# Patient Record
Sex: FEMALE | Race: WHITE | NOT HISPANIC OR LATINO | Employment: OTHER | URBAN - METROPOLITAN AREA
[De-identification: names, ages, dates, MRNs, and addresses within clinical notes are randomized per-mention and may not be internally consistent; named-entity substitution may affect disease eponyms.]

---

## 2017-05-03 ENCOUNTER — HOSPITAL ENCOUNTER (OUTPATIENT)
Dept: RADIOLOGY | Facility: CLINIC | Age: 32
Discharge: HOME | End: 2017-05-03
Payer: COMMERCIAL

## 2017-05-03 ENCOUNTER — ALLSCRIPTS OFFICE VISIT (OUTPATIENT)
Dept: OTHER | Facility: OTHER | Age: 32
End: 2017-05-03

## 2017-05-03 DIAGNOSIS — M79.675 PAIN OF TOE OF LEFT FOOT: ICD-10-CM

## 2017-05-03 PROCEDURE — 73660 X-RAY EXAM OF TOE(S): CPT

## 2017-12-14 ENCOUNTER — ALLSCRIPTS OFFICE VISIT (OUTPATIENT)
Dept: OTHER | Facility: OTHER | Age: 32
End: 2017-12-14

## 2018-01-09 NOTE — RESULT NOTES
Verified Results  (1) CBC/PLT/DIFF [id number] 08:55AM [facility] [place]     Test Name Result Flag Reference   WBC 8 0 x10E3/uL  3 4-10 8   RBC 4 75 x10E6/uL  3 77-5 28   Hemoglobin 15 2 g/dL  11 1-15 9   Hematocrit 43 0 %  34 0-46  6   MCV 91 fL  79-97   MCH 32 0 pg  26 6-33 0   MCHC 35 3 g/dL  31 5-35 7   RDW 12 8 %  12 3-15 4   Platelets 610 N88Q7/KM  150-379   Neutrophils 59 %     Lymphs 25 %     Monocytes 6 %     Eos 9 %     Basos 1 %     Neutrophils (Absolute) 4 8 x10E3/uL  1 4-7 0   Lymphs (Absolute) 2 0 x10E3/uL  0 7-3 1   Monocytes(Absolute) 0 4 x10E3/uL  0 1-0 9   Eos (Absolute) 0 7 x10E3/uL H 0 0-0 4   Baso (Absolute) 0 1 x10E3/uL  0 0-0 2   Immature Granulocytes 0 %     Immature Grans (Abs) 0 0 x10E3/uL  0 0-0 1     (1) COMPREHENSIVE METABOLIC PANEL [id number] 14:98BW [facility]     Test Name Result Flag Reference   Glucose, Serum 99 mg/dL  65-99   BUN 13 mg/dL  6-20   Creatinine, Serum 0 76 mg/dL  0 57-1 00   eGFR If NonAfricn Am 103 mL/min/1 73  >59   eGFR If Africn Am 119 mL/min/1 73  >59   BUN/Creatinine Ratio 17  8-20   Sodium, Serum 139 mmol/L  134-144   Potassium, Serum 3 9 mmol/L  3 5-5 2   Chloride, Serum 102 mmol/L     Carbon Dioxide, Total 21 mmol/L  18-29   Calcium, Serum 9 2 mg/dL  8 7-10 2   Protein, Total, Serum 7 0 g/dL  6 0-8 5   Albumin, Serum 4 6 g/dL  3 5-5 5   Globulin, Total 2 4 g/dL  1 5-4 5   A/G Ratio 1 9  1 1-2 5   Bilirubin, Total 0 2 mg/dL  0 0-1 2   Alkaline Phosphatase, S 58 IU/L     AST (SGOT) 19 IU/L  0-40   ALT (SGPT) 23 IU/L  0-32     (1) LIPID PANEL FASTING W DIRECT LDL REFLEX [id number] 08:55AM [facility]     Test Name Result Flag Reference   Cholesterol, Total 217 mg/dL H 100-199   Triglycerides 215 mg/dL H 0-149   HDL Cholesterol 44 mg/dL  >39   According to ATP-III Guidelines, HDL-C >59 mg/dL is considered a  negative risk factor for CHD     LDL Cholesterol Calc 130 mg/dL H 0-99     (1) TSH [id number] 08:55AM [facility]     Test Name Result Flag Reference   TSH 1 820 uIU/mL  0 450-4 500     (1) IRON SATURATION %, TIBC [id number] 08:55AM [facility] [place]     Test Name Result Flag Reference   Iron Bind  Cap (TIBC) 409 ug/dL  250-450   UIBC 351 ug/dL  131-425   Iron, Serum 58 ug/dL     Iron Saturation 14 % L 15-55     (1) VITAMIN D 25-HYDROXY 10Pso1147 08:55AM [facility]     Test Name Result Flag Reference   Vitamin D, 25-Hydroxy 25 4 ng/mL L 30 0-100 0   Vitamin D deficiency has been defined by the [address] practice guideline as a  level of serum 25-OH vitamin D less than 20 ng/mL (1,2)  The Endocrine Society went on to further define vitamin D  insufficiency as a level between 21 and 29 ng/mL (2)  1  IOM ([place] [employer])  2010  Dietary reference     intakes for calcium and D  [facility]: The     [employer]  2  Anjali MF, Lance WHELAN, Zehra BAKER, et al      Evaluation, treatment, and prevention of vitamin D     deficiency: an Endocrine Society clinical practice     guideline  JCEM  2011 Jul; 96(7):1911-30  Discussion/Summary   Blood count is normal       Sugar, kidneys, minerals and liver are normal       Cholesterol profile is normal/ok  You could use a little more iron in your diet daily  Thyroid level is normal       You could iimprove your vitamin D by increasing your vitamin D intake by an additional 1000 units/day   Dr Rohit Sánchez

## 2018-01-12 VITALS
HEIGHT: 66 IN | DIASTOLIC BLOOD PRESSURE: 86 MMHG | BODY MASS INDEX: 29.73 KG/M2 | SYSTOLIC BLOOD PRESSURE: 118 MMHG | WEIGHT: 185 LBS

## 2018-01-13 NOTE — PROGRESS NOTES
Assessment    1  Eczema, unspecified type (692 9) (L30 9)   2  Encounter for preventive health examination (V70 0) (Z00 00)   3  Immunization due (V05 9) (Z23)   4  Screening for cardiovascular condition (V81 2) (Z13 6)   5  Screening for diabetes mellitus (DM) (V77 1) (Z13 1)   6  Fatigue (780 79) (R53 83)   7  Avitaminosis D (268 9) (E55 9)   8  BMI 31 0-31 9,adult (V85 31) (Z68 31)   9  Obesity, unspecified obesity severity, unspecified obesity type (278 00) (E66 9)    Plan  Avitaminosis D, Health Maintenance, Fatigue, Screening for cardiovascular condition,  Screening for diabetes mellitus (DM)    · (1) VITAMIN D 25-HYDROXY; Status:Active; Requested for:55Hlc7989;   Eczema, unspecified type    · From  Mometasone Furoate 0 1 % External Solution One drop in ears as  needed To Mometasone Furoate 0 1 % External Solution 3 gtts in affected ear bid prn  eczema, short term  Health Maintenance, Fatigue, Screening for cardiovascular condition, Screening for  diabetes mellitus (DM)    · (1) CBC/PLT/DIFF; Status:Active; Requested for:24Vee0812;    · (1) COMPREHENSIVE METABOLIC PANEL; Status:Active; Requested for:94Bcr2394;    · (1) IRON SATURATION %, TIBC; Status:Active; Requested for:16Ifc1778;    · (1) LIPID PANEL FASTING W DIRECT LDL REFLEX; Status:Active; Requested  for:60Mlx6835;    · (1) TSH; Status:Active; Requested for:95Mtd0553;   Immunization due    · Fluzone Quadrivalent Intramuscular Suspension    Discussion/Summary  health maintenance visit Advice and education were given regarding nutrition, aerobic exercise, weight loss, calcium supplements, vitamin D supplements and sunscreen use  Patient discussion: discussed with the patient  Chief Complaint  Seen for a CPE  er/cma  History of Present Illness  HM, Adult Female: The patient is being seen for a health maintenance and no probs during preg except some anemia evaluation  General Health: Immunizations status: up to date  Lifestyle:   She does not have a healthy diet  She has weight concerns  She does not exercise regularly  She does not use tobacco  She denies alcohol use  70# in 3y gain  Reproductive health:  she reports normal menses  Screening:   HPI: csection  anemia?some extra iron pregnancy  20m postpartum  periods mostly normal  lasik surgery  uterine cysts removed  not on bcp  ear eczema       Review of Systems    Constitutional: feeling tired  Cardiovascular: no chest pain  Respiratory: no shortness of breath  Gastrointestinal: constipation and diarrhea, but no blood in stools  Psychiatric: no anxiety and no depression  Active Problems    1  Immunization due (V05 9) (Z23)   2  Screening for cardiovascular condition (V81 2) (Z13 6)   3  Screening for diabetes mellitus (DM) (V77 1) (Z13 1)    Past Medical History    · Acute upper respiratory infection (465 9) (J06 9)   · History of Clomid pregnancy (V23 0) (O09 00)   · History of nausea (V12 79) (Z87 898)   · History of pregnancy (V13 29)   · No pertinent past medical history    Surgical History    · History of  Section    Family History  Father    · Family history of hypertension (V17 49) (Z82 49)    Social History    · Never a smoker    Current Meds   1  Mometasone Furoate 0 1 % External Solution; One drop in ears as needed Recorded    Allergies    1  Amoxicillin CAPS    2  No Known Environmental Allergies   3  No Known Food Allergies    Vitals   Recorded: 78PZZ4469 19:84GY   Systolic 046   Diastolic 76   Heart Rate 80   Respiration 16   Temperature 98 9 F   LMP 01-Sep-2016   Height 5 ft 6 in   Weight 193 lb    BMI Calculated 31 15   BSA Calculated 1 98     Physical Exam    Constitutional   General appearance: No acute distress, well appearing and well nourished  Eyes   Conjunctiva and lids: No swelling, erythema or discharge  Pupils and irises: Equal, round, reactive to light  Ophthalmoscopic examination: Normal fundi and optic discs      Ears, Nose, Mouth, and Throat   External inspection of ears and nose: Normal     Otoscopic examination: Tympanic membranes translucent with normal light reflex  Canals patent without erythema  Hearing: Normal     Nasal mucosa, septum, and turbinates: Normal without edema or erythema  Lips, teeth, and gums: Normal, good dentition  Oropharynx: Normal with no erythema, edema, exudate or lesions  Neck   Neck: Supple, symmetric, trachea midline, no masses  Thyroid: Normal, no thyromegaly  Pulmonary   Respiratory effort: No increased work of breathing or signs of respiratory distress  Auscultation of lungs: Clear to auscultation  Cardiovascular   Auscultation of heart: Normal rate and rhythm, normal S1 and S2, no murmurs  Carotid pulses: 2+ bilaterally  Pedal pulses: 2+ bilaterally  Examination of extremities for edema and/or varicosities: Normal     Abdomen   Abdomen: Non-tender, no masses  Liver and spleen: No hepatomegaly or splenomegaly  Lymphatic   Palpation of lymph nodes in neck: No lymphadenopathy  Musculoskeletal   Gait and station: Normal     Digits and nails: Normal without clubbing or cyanosis  Joints, bones, and muscles: Normal     Range of motion: Normal     Stability: Normal     Skin   Skin and subcutaneous tissue: Normal without rashes or lesions  Palpation of skin and subcutaneous tissue: Normal turgor  Neurologic   Reflexes: 2+ and symmetric  Sensation: No sensory loss  Psychiatric   Judgment and insight: Normal     Mood and affect: Normal        Procedure    Procedure: Visual Acuity Test    Indication: routine screening  Inforrmation supplied by a Snellen chart     Results: 20/13 in both eyes without corrective device, 20/13 in the right eye without corrective device, 20/13 in the left eye without corrective device      Signatures   Electronically signed by : Neva Cox DO; Sep 21 2016 11:26PM EST                       (Author)

## 2018-01-23 NOTE — PROGRESS NOTES
Assessment    1  Encounter for preventive health examination (V70 0) (Z00 00)   2  Adult BMI 27 0-27 9 kg/sq m (V85 23) (Z68 27)   3  Screening for cardiovascular condition (V81 2) (Z13 6)   4  Screening for diabetes mellitus (DM) (V77 1) (Z13 1)   5  Vitamin D deficiency (268 9) (E55 9)   6  Fatigue (780 79) (R53 83)   7  Eczema, unspecified type (692 9) (L30 9)    Plan  Adult BMI 27 0-27 9 kg/sq m    · Begin a limited exercise program ; Status:Complete;   Done: 21HUH6446   · Eat a low fat and low cholesterol diet ; Status:Complete;   Done: 22VFW5182   · Some eating tips that can help you lose weight ; Status:Complete;   Done: 64QAB5974   · We recommend that you bring your body mass index down to 26 ; Status:Complete;    Done: 94MEB7011  Adult BMI 27 0-27 9 kg/sq m, Fatigue, Screening for cardiovascular condition, Screening  for diabetes mellitus (DM), Vitamin D deficiency    · (1) CBC/PLT/DIFF; Status:Active; Requested for:20Xht4807;    · (1) COMPREHENSIVE METABOLIC PANEL; Status:Active; Requested for:10Ent8003;    · (1) LIPID PANEL FASTING W DIRECT LDL REFLEX; Status:Active; Requested  for:72Smy1403;    · (1) TSH; Status:Active; Requested for:96Bfg1826;    · (1) VITAMIN D 25-HYDROXY; Status:Active; Requested for:87Nuh6397;   Eczema, unspecified type    · Mometasone Furoate 0 1 % External Solution; 3 gtts in affected ear bid prn  eczema, short term    Discussion/Summary  health maintenance visit Currently, she eats a healthy diet and has an inadequate exercise regimen  the risks and benefits of cervical cancer screening were discussed cervical cancer screening is current Will collect reports Breast cancer screening: the risks and benefits of breast cancer screening were discussed, self breast exam technique was taught and monthly self breast exam was advised  Colorectal cancer screening: the risks and benefits of colorectal cancer screening were discussed and colorectal cancer screening is not indicated  Osteoporosis screening: the risks and benefits of osteoporosis screening were discussed  Screening lab work includes hemoglobin, glucose, lipid profile, thyroid function testing and 25-hydroxyvitamin D  The risks and benefits of immunizations were discussed and immunizations are up to date  Advice and education were given regarding nutrition, aerobic exercise, weight bearing exercise, weight loss, calcium supplements, vitamin D supplements, reproductive health, contraception, cardiovascular risk reduction, alcohol use, sunscreen use, self skin examination, helmet use, seat belt use and fall risk reduction  Patient discussion: discussed with the patient  Physical done and blood work ordered  Will collect PAP report  Will call with Tdap date and received couple of years ago as per patient  Patient educated and instructions provided when to seek immediate medical attention  The patient was counseled regarding instructions for management, risk factor reductions, patient and family education, importance of compliance with treatment  Possible side effects of new medications were reviewed with the patient/guardian today  The treatment plan was reviewed with the patient/guardian  The patient/guardian understands and agrees with the treatment plan      Chief Complaint  pt present for CPE, no forms  af/rma      History of Present Illness  HM, Adult Female: The patient is being seen for a health maintenance evaluation  General Health: The patient's health since the last visit is described as good  She has regular dental visits  She denies vision problems  She denies hearing loss  Lifestyle:  She consumes a diverse and healthy diet  She does not have any weight concerns  She does not exercise regularly  She does not use tobacco  She denies alcohol use  She denies drug use  Reproductive health:  she reports normal menses  she uses no contraception  she is sexually active  pregnancy history:     Screening: HPI: Here for physical   Patient had the PAP 2 years ago at [facility]  Denies any concerns and complaints  Not on any current medications  Review of Systems    Constitutional: No fever, no chills, feels well, no tiredness, no recent weight gain or weight loss  Eyes: No complaints of eye pain, no red eyes, no eyesight problems, no discharge, no dry eyes, no itching of eyes  ENT: no complaints of earache, no loss of hearing, no nose bleeds, no nasal discharge, no sore throat, no hoarseness  Cardiovascular: No complaints of slow heart rate, no fast heart rate, no chest pain, no palpitations, no leg claudication, no lower extremity edema  Respiratory: No complaints of shortness of breath, no wheezing, no cough, no SOB on exertion, no orthopnea, no PND  Gastrointestinal: No complaints of abdominal pain, no constipation, no nausea or vomiting, no diarrhea, no bloody stools  Genitourinary: No complaints of dysuria, no incontinence, no pelvic pain, no dysmenorrhea, no vaginal discharge or bleeding  Musculoskeletal: No complaints of arthralgias, no myalgias, no joint swelling or stiffness, no limb pain or swelling  Integumentary: No complaints of skin rash or lesions, no itching, no skin wounds, no breast pain or lump  Neurological: No complaints of headache, no confusion, no convulsions, no numbness, no dizziness or fainting, no tingling, no limb weakness, no difficulty walking  Psychiatric: Not suicidal, no sleep disturbance, no anxiety or depression, no change in personality, no emotional problems  Endocrine: No complaints of proptosis, no hot flashes, no muscle weakness, no deepening of the voice, no feelings of weakness  Hematologic/Lymphatic: No complaints of swollen glands, no swollen glands in the neck, does not bleed easily, does not bruise easily  Active Problems    1  Eczema, unspecified type (692 9) (L30 9)   2  Fatigue (780 79) (R53 83)   3  Immunization due (V05 9) (Z23)   4  Obesity, unspecified obesity severity, unspecified obesity type (278 00) (E66 9)   5  Pain of toe of left foot (729 5) (M79 675)   6  Patellofemoral arthritis of right knee (716 96) (M17 11)   7  Right knee pain (719 46) (M25 561)   8  Right patellofemoral syndrome (719 46) (M22 2X1)   9  Screening for cardiovascular condition (V81 2) (Z13 6)   10  Screening for diabetes mellitus (DM) (V77 1) (Z13 1)   11  Sprain of left great toe (845 10) (Q74 465R)    Past Medical History    · Acute conjunctivitis (372 00) (H10 30)   · Acute upper respiratory infection (465 9) (J06 9)   · History of Clomid pregnancy (V23 0) (O09 00)   · History of nausea (V12 79) (Z87 898)   · History of pregnancy (V13 29)   · No pertinent past medical history    Surgical History    · History of  Section   · History of Oral Surgery Tooth Extraction   · History of Uterine Surgery    Family History  Father    · Family history of hypertension (V17 49) (Z82 49)  Family History    · Family history of arthritis (V17 7) (Z82 61)    Social History    · Alcohol use (V49 89) (Z78 9)   · Never a smoker   · Non smoker / tobacco user (V49 89) (Z78 9)    Current Meds   1  Mometasone Furoate 0 1 % External Solution; 3 gtts in affected ear bid prn eczema,   short term; Last Rx:98Pke9883 Ordered   2  Multiple Vitamins Oral Tablet; TAKE 1 TABLET DAILY; Therapy: 58UQH6905 to Recorded    Allergies    1  amoxicillin   2  Amoxicillin CAPS    3  No Known Environmental Allergies   4  No Known Food Allergies    Vitals   Recorded: 77Zuk4002 11:06AM   Temperature 98 1 F   Heart Rate 82   Respiration 16   Systolic 488   Diastolic 86   Height 5 ft 6 in   Weight 171 lb 2 oz   BMI Calculated 27 62   BSA Calculated 1 87     Physical Exam    Constitutional   General appearance: No acute distress, well appearing and well nourished  Head and Face   Head and face: Normal     Palpation of the face and sinuses: No sinus tenderness  Eyes   Conjunctiva and lids: No swelling, erythema or discharge  Ears, Nose, Mouth, and Throat   External inspection of ears and nose: Normal     Otoscopic examination: Tympanic membranes translucent with normal light reflex  Canals patent without erythema  Nasal mucosa, septum, and turbinates: Normal without edema or erythema  Lips, teeth, and gums: Normal, good dentition  Oropharynx: Normal with no erythema, edema, exudate or lesions  Neck   Neck: Supple, symmetric, trachea midline, no masses  Thyroid: Normal, no thyromegaly  Pulmonary   Respiratory effort: No increased work of breathing or signs of respiratory distress  Palpation of chest: Normal     Auscultation of lungs: Clear to auscultation  Cardiovascular   Auscultation of heart: Normal rate and rhythm, normal S1 and S2, no murmurs  Carotid pulses: 2+ bilaterally  Femoral pulses: 2+ bilaterally  Pedal pulses: 2+ bilaterally  Examination of extremities for edema and/or varicosities: Normal     Chest Done by GYN  Abdomen   Abdomen: Non-tender, no masses  Liver and spleen: No hepatomegaly or splenomegaly  Examination for hernias: No hernia appreciated  Genitourinary Done by GYN  Lymphatic   Palpation of lymph nodes in neck: No lymphadenopathy  Palpation of lymph nodes in axillae: No lymphadenopathy  Palpation of lymph nodes in groin: No lymphadenopathy  Musculoskeletal   Gait and station: Normal     Digits and nails: Normal without clubbing or cyanosis  Joints, bones, and muscles: Normal     Range of motion: Normal     Stability: Normal     Skin   Skin and subcutaneous tissue: Normal without rashes or lesions  Neurologic   Reflexes: 2+ and symmetric  Sensation: No sensory loss  Coordination: Normal finger to nose and heel to shin  Psychiatric   Judgment and insight: Normal     Orientation to person, place, and time: Normal     Recent and remote memory: Intact      Mood and affect: Normal        Results/Data  PHQ-2 Adult Depression Screening 34ASF9447 11:08AM User, Ahs     Test Name Result Flag Reference   PHQ-2 Adult Depression Score 0     Over the last two weeks, how often have you been bothered by any of the following problems? Little interest or pleasure in doing things: Not at all - 0  Feeling down, depressed, or hopeless: Not at all - 0   PHQ-2 Adult Depression Screening Negative         Procedure    Procedure: Visual Acuity Test    Inforrmation supplied by a Snellen chart  Results: 20/13 in both eyes without corrective device, 20/13 in the right eye without corrective device, 20/15 in the left eye without corrective device      Attending Note  Collaborating Physician Note: Collaborating Physician: I agree with the Advanced Practitioner note        Signatures   Electronically signed by : Tristan Shen; Dec 14 2017 11:32AM EST                       (Author)    Electronically signed by : Aba Rodriguez DO; Dec 14 2017 12:36PM EST                       (Author)

## 2018-01-24 VITALS
DIASTOLIC BLOOD PRESSURE: 86 MMHG | SYSTOLIC BLOOD PRESSURE: 112 MMHG | BODY MASS INDEX: 27.5 KG/M2 | HEIGHT: 66 IN | RESPIRATION RATE: 16 BRPM | HEART RATE: 82 BPM | TEMPERATURE: 98.1 F | WEIGHT: 171.13 LBS

## 2018-06-21 ENCOUNTER — TELEPHONE (OUTPATIENT)
Dept: FAMILY MEDICINE CLINIC | Facility: CLINIC | Age: 33
End: 2018-06-21

## 2018-06-21 DIAGNOSIS — R53.83 OTHER FATIGUE: ICD-10-CM

## 2018-06-21 DIAGNOSIS — Z13.6 SCREENING FOR CARDIOVASCULAR CONDITION: Primary | ICD-10-CM

## 2018-06-21 NOTE — TELEPHONE ENCOUNTER
Saw Albin Parrish and was suppose to get bloodwork after physical   I do not see orders in chart?     [facility]     Call when ready for  Deyvi Nails

## 2018-08-31 ENCOUNTER — OFFICE VISIT (OUTPATIENT)
Dept: FAMILY MEDICINE CLINIC | Facility: CLINIC | Age: 33
End: 2018-08-31
Payer: COMMERCIAL

## 2018-08-31 VITALS
HEART RATE: 82 BPM | TEMPERATURE: 97.4 F | RESPIRATION RATE: 18 BRPM | WEIGHT: 168 LBS | DIASTOLIC BLOOD PRESSURE: 72 MMHG | BODY MASS INDEX: 27 KG/M2 | HEIGHT: 66 IN | SYSTOLIC BLOOD PRESSURE: 116 MMHG

## 2018-08-31 DIAGNOSIS — M54.2 CERVICALGIA: Primary | ICD-10-CM

## 2018-08-31 PROCEDURE — 1036F TOBACCO NON-USER: CPT | Performed by: FAMILY MEDICINE

## 2018-08-31 PROCEDURE — 99213 OFFICE O/P EST LOW 20 MIN: CPT | Performed by: FAMILY MEDICINE

## 2018-08-31 PROCEDURE — 3008F BODY MASS INDEX DOCD: CPT | Performed by: FAMILY MEDICINE

## 2018-08-31 RX ORDER — MOMETASONE FUROATE 1 MG/ML
SOLUTION TOPICAL AS NEEDED
COMMUNITY
End: 2021-07-31

## 2018-08-31 RX ORDER — CYCLOBENZAPRINE HCL 10 MG
10 TABLET ORAL
Qty: 21 TABLET | Refills: 0 | Status: SHIPPED | OUTPATIENT
Start: 2018-08-31 | End: 2021-07-31

## 2018-08-31 RX ORDER — PREDNISONE 20 MG/1
TABLET ORAL
Qty: 18 TABLET | Refills: 0 | Status: SHIPPED | OUTPATIENT
Start: 2018-08-31 | End: 2021-07-31

## 2018-08-31 RX ORDER — NAPROXEN 500 MG/1
TABLET ORAL 2 TIMES DAILY
COMMUNITY
Start: 2018-08-28

## 2018-08-31 RX ORDER — MOXIFLOXACIN 5 MG/ML
1 SOLUTION/ DROPS OPHTHALMIC AS NEEDED
COMMUNITY
Start: 2016-12-07 | End: 2021-07-31

## 2018-08-31 NOTE — PROGRESS NOTES
Assessment/Plan:    Problem List Items Addressed This Visit     None      Visit Diagnoses     Cervicalgia    -  Primary    Relevant Medications    predniSONE 20 mg tablet    cyclobenzaprine (FLEXERIL) 10 mg tablet          There are no Patient Instructions on file for this visit  No Follow-up on file  Subjective:      Patient ID: Nancy Mckeon is a 26 y o  female  Chief Complaint   Patient presents with    muscle spasms in neck and R shoulder for the past week     rmklpn    Headache       Pt states she has been neck pain and spasm that has been getting worse all week  One day she woke up and is causing sghooting pain now its going up and down rt shoulder  failer otc as well as massage and hot pads  The following portions of the patient's history were reviewed and updated as appropriate: allergies, current medications, past family history, past medical history, past social history, past surgical history and problem list     Review of Systems   Constitutional: Negative  Negative for activity change, appetite change, chills, diaphoresis and fatigue  HENT: Negative  Negative for dental problem, ear pain, sinus pressure and sore throat  Eyes: Negative  Negative for photophobia, pain, discharge, redness, itching and visual disturbance  Respiratory: Negative for apnea and chest tightness  Cardiovascular: Negative  Negative for chest pain, palpitations and leg swelling  Gastrointestinal: Negative  Negative for abdominal distention, abdominal pain, constipation and diarrhea  Endocrine: Negative  Negative for cold intolerance and heat intolerance  Genitourinary: Negative  Negative for difficulty urinating and dyspareunia  Musculoskeletal: Positive for arthralgias, myalgias, neck pain and neck stiffness  Negative for back pain  Skin: Negative  Allergic/Immunologic: Negative for environmental allergies  Neurological: Negative  Negative for dizziness     Psychiatric/Behavioral: Negative  Negative for agitation  Current Outpatient Prescriptions   Medication Sig Dispense Refill    mometasone (ELOCON) 0 1 % lotion Apply topically as needed      moxifloxacin (VIGAMOX) 0 5 % ophthalmic solution Apply 1 drop to eye as needed      Multiple Vitamins-Minerals (MULTIVITAMIN ADULT PO) Take 1 tablet by mouth daily      naproxen (NAPROSYN) 500 mg tablet 2 (two) times a day      cyclobenzaprine (FLEXERIL) 10 mg tablet Take 1 tablet (10 mg total) by mouth daily at bedtime for 21 days 21 tablet 0    predniSONE 20 mg tablet 3 tabs for three days, 2 tabs for three days, 1 tab for three days, 1/2 tab for 4 days 18 tablet 0     No current facility-administered medications for this visit  Objective:    /72   Pulse 82   Temp (!) 97 4 °F (36 3 °C)   Resp 18   Ht 5' 6" (1 676 m)   Wt 76 2 kg (168 lb)   LMP 08/24/2018 (Approximate)   BMI 27 12 kg/m²        Physical Exam   Constitutional: She appears well-developed and well-nourished  No distress  HENT:   Head: Normocephalic and atraumatic  Right Ear: External ear normal    Left Ear: External ear normal    Nose: Nose normal    Mouth/Throat: Oropharynx is clear and moist  No oropharyngeal exudate  Eyes: EOM are normal  Pupils are equal, round, and reactive to light  Right eye exhibits no discharge  Left eye exhibits no discharge  No scleral icterus  Neck: No thyromegaly present  Cardiovascular: Normal rate and normal heart sounds  No murmur heard  Pulmonary/Chest: Effort normal and breath sounds normal  No respiratory distress  She has no wheezes  Abdominal: Soft  Bowel sounds are normal  She exhibits no distension and no mass  There is no tenderness  There is no rebound and no guarding  Musculoskeletal: Normal range of motion  Arms:  Neurological: She is alert  She displays normal reflexes  Coordination normal    Skin: Skin is warm and dry  No rash noted  She is not diaphoretic  No erythema     Psychiatric: She has a normal mood and affect  Her behavior is normal    Nursing note and vitals reviewed               Margo Chamberlain DO

## 2019-08-02 ENCOUNTER — AMB VIDEO VISIT (OUTPATIENT)
Dept: OTHER | Facility: HOSPITAL | Age: 34
End: 2019-08-02

## 2019-08-02 DIAGNOSIS — R39.9 UTI SYMPTOMS: Primary | ICD-10-CM

## 2019-08-02 PROCEDURE — EVISIT: Performed by: FAMILY MEDICINE

## 2019-08-02 RX ORDER — NITROFURANTOIN 25; 75 MG/1; MG/1
100 CAPSULE ORAL 2 TIMES DAILY
Qty: 10 CAPSULE | Refills: 0 | Status: SHIPPED | OUTPATIENT
Start: 2019-08-02 | End: 2021-07-31

## 2019-08-02 NOTE — CARE ANYWHERE EVISITS
Visit Summary for Jie Parnell - Gender: Female - Date of Birth: [id number]  Date: [id number] - Duration: 5 minutes  Patient: Jie Parnell  Provider: Eva Seals    Patient Contact Information  Address  [address] [place] [address]; [place] [address]  [phone]    Visit Topics  UTI symptoms [Added By: Self - 2019-08-02]    Conversation Transcripts  [0A][0A] [Notification] You are connected with Eva Seals, Urgent Care Specialist [0A][Notification] Mady Luna is located in [place]  [0A][Notification] Mady Luna has shared health history  Madan Martin  [0A]    Diagnosis    Procedures  Value: [id number] Code: CPT-4 UNLISTED E&M SERVICE    Medications Prescribed    No prescriptions ordered    Electronically signed by: Cielo Robertson(NPI [id number])

## 2019-08-02 NOTE — PROGRESS NOTES
[facility]        NAME: Jonel Hudson is a 37 y o  female  : 1985    MRN: [id number]  DATE: 2019  TIME: 8:44 AM    Assessment and Plan   UTI symptoms [R39 9]  1  UTI symptoms  nitrofurantoin (MACROBID) 100 mg capsule         Patient Instructions     Patient Instructions   1  UTI symptoms  - Macrobid x 5 days prescribed, complete as directed   - drink plenty of fluids  - follow up w/ pcp for re-check in 5-7 days   - if symptoms persist despite treatment, worsen, or any new symptoms present, should be seen in the ER         Video Visit - Jonel Hudson 37 y o  female MRN: [id number]    REQUIRED DOCUMENTATION:       There were no vitals filed for this visit  1  This service was provided via [employer]  2  Provider located at [address] [facility]  [phone]   3  [facility] provider: Kimmie Braxton MD   4  Identify all parties in room with patient during [address] visit: patient's son  5  After connecting through [employer], patient was identified by name and date of birth  Patient was then informed that this was a Telemedicine visit and that the exam was being conducted confidentially over secure lines  My office door was closed  Other methods to assure confidentiality were taken  No one else was in the room  and The following individuals were in the room with me and the patient informed  Patient acknowledged consent and understanding of privacy and security of the Telemedicine visit  I informed the patient that I have reviewed their record in Epic and presented the opportunity for them to ask any questions regarding the visit today  The patient agreed to participate  Chief Complaint     Chief Complaint   Patient presents with    Possible UTI         History of Present Illness       38 yo female presents via virtual visit for possible UTI  She is experiencing urinary urgency, frequency, dysuria, and suprapubic pressure x 2 days  No blood in the urine  No fever/chills  No nausea/vomiting or diarrhea  No vaginal bleeding or discharge  No flank/CVA pain  No abdominal pain  She has been taking Ibuprofen prn  PMHx: none   Rx: MVI  Allergies: PCN  Pregnancy: no   BF: no   Smoker: no      Review of Systems   Review of Systems   Constitutional: Negative  Respiratory: Negative  Cardiovascular: Negative  Gastrointestinal: Negative  Genitourinary:        As noted in HPI   Musculoskeletal: Negative  Skin: Negative  Current Medications       Current Outpatient Medications:     cyclobenzaprine (FLEXERIL) 10 mg tablet, Take 1 tablet (10 mg total) by mouth daily at bedtime for 21 days, Disp: 21 tablet, Rfl: 0    mometasone (ELOCON) 0 1 % lotion, Apply topically as needed, Disp: , Rfl:     moxifloxacin (VIGAMOX) 0 5 % ophthalmic solution, Apply 1 drop to eye as needed, Disp: , Rfl:     Multiple Vitamins-Minerals (MULTIVITAMIN ADULT PO), Take 1 tablet by mouth daily, Disp: , Rfl:     naproxen (NAPROSYN) 500 mg tablet, 2 (two) times a day, Disp: , Rfl:     nitrofurantoin (MACROBID) 100 mg capsule, Take 1 capsule (100 mg total) by mouth 2 (two) times a day, Disp: 10 capsule, Rfl: 0    predniSONE 20 mg tablet, 3 tabs for three days, 2 tabs for three days, 1 tab for three days, 1/2 tab for 4 days, Disp: 18 tablet, Rfl: 0    Current Allergies     Allergies as of 08/02/2019 - Reviewed 08/02/2019   Allergen Reaction Noted    Amoxicillin Other (See Comments) 09/30/2016            The following portions of the patient's history were reviewed and updated as appropriate: allergies, current medications, past family history, past medical history, past social history, past surgical history and problem list      History reviewed  No pertinent past medical history  History reviewed  No pertinent surgical history  History reviewed  No pertinent family history  Medications have been verified          Objective   There were no vitals taken for this visit  Physical Exam     Physical Exam   Constitutional: She is oriented to person, place, and time  She appears well-developed and well-nourished  She is active and cooperative  Non-toxic appearance  She does not have a sickly appearance  She does not appear ill  No distress  Abdominal: There is no tenderness  There is no CVA tenderness  Neurological: She is alert and oriented to person, place, and time  Skin: She is not diaphoretic  Psychiatric: She has a normal mood and affect   Her behavior is normal  Judgment and thought content normal

## 2019-08-02 NOTE — PATIENT INSTRUCTIONS
1   UTI symptoms  - Macrobid x 5 days prescribed, complete as directed   - drink plenty of fluids  - follow up w/ pcp for re-check in 5-7 days   - if symptoms persist despite treatment, worsen, or any new symptoms present, should be seen in the ER

## 2020-07-28 ENCOUNTER — TELEPHONE (OUTPATIENT)
Dept: FAMILY MEDICINE CLINIC | Facility: CLINIC | Age: 35
End: 2020-07-28

## 2020-07-28 NOTE — TELEPHONE ENCOUNTER
----- Message from Corrinne Solid sent at 6/22/2020 11:13 AM EDT -----  Regarding: overdue visit  Last visit Aug 2018, please contact patient for overdue visit

## 2020-07-28 NOTE — TELEPHONE ENCOUNTER
LMOM  Is pt still with [facility] ? If so needs CPE  Please pick PCP and schedule when pt calls back       Zach Arana MA

## 2021-03-30 DIAGNOSIS — Z23 ENCOUNTER FOR IMMUNIZATION: ICD-10-CM

## 2021-04-08 ENCOUNTER — IMMUNIZATIONS (OUTPATIENT)
Dept: FAMILY MEDICINE CLINIC | Facility: HOSPITAL | Age: 36
End: 2021-04-08

## 2021-04-08 DIAGNOSIS — Z23 ENCOUNTER FOR IMMUNIZATION: Primary | ICD-10-CM

## 2021-04-08 PROCEDURE — 91300 SARS-COV-2 / COVID-19 MRNA VACCINE (PFIZER-BIONTECH) 30 MCG: CPT

## 2021-04-08 PROCEDURE — 0002A SARS-COV-2 / COVID-19 MRNA VACCINE (PFIZER-BIONTECH) 30 MCG: CPT

## 2021-04-29 ENCOUNTER — IMMUNIZATIONS (OUTPATIENT)
Dept: FAMILY MEDICINE CLINIC | Facility: HOSPITAL | Age: 36
End: 2021-04-29

## 2021-04-29 DIAGNOSIS — Z23 ENCOUNTER FOR IMMUNIZATION: Primary | ICD-10-CM

## 2021-04-29 PROCEDURE — 91300 SARS-COV-2 / COVID-19 MRNA VACCINE (PFIZER-BIONTECH) 30 MCG: CPT

## 2021-04-29 PROCEDURE — 0002A SARS-COV-2 / COVID-19 MRNA VACCINE (PFIZER-BIONTECH) 30 MCG: CPT

## 2021-06-02 ENCOUNTER — TELEPHONE (OUTPATIENT)
Dept: FAMILY MEDICINE CLINIC | Facility: CLINIC | Age: 36
End: 2021-06-02

## 2021-06-02 NOTE — LETTER
June 8, 2021     [address] [facility] [id number]      Dear Ms Lashonda Spivey:    In addition to helping you feel better when you are sick, we are interested in preventing illness and injury in the first place  In the spirit of maintaining your good health, our system indicates that you are due for the following:    Annual Physical     Please call us to make an appointment at your earliest convenience  I look forward to seeing you soon          Sincerely,         Rola Garcia MA    CC: No Recipients

## 2021-07-31 ENCOUNTER — HOSPITAL ENCOUNTER (EMERGENCY)
Facility: HOSPITAL | Age: 36
Discharge: HOME | End: 2021-07-31
Attending: EMERGENCY MEDICINE
Payer: COMMERCIAL

## 2021-07-31 VITALS
SYSTOLIC BLOOD PRESSURE: 113 MMHG | BODY MASS INDEX: 32.25 KG/M2 | WEIGHT: 199.8 LBS | OXYGEN SATURATION: 98 % | RESPIRATION RATE: 18 BRPM | DIASTOLIC BLOOD PRESSURE: 60 MMHG | TEMPERATURE: 97.5 F | HEART RATE: 73 BPM

## 2021-07-31 DIAGNOSIS — S61.419A HAND LACERATION: Primary | ICD-10-CM

## 2021-07-31 PROCEDURE — 99282 EMERGENCY DEPT VISIT SF MDM: CPT

## 2021-07-31 PROCEDURE — 90715 TDAP VACCINE 7 YRS/> IM: CPT | Performed by: PHYSICIAN ASSISTANT

## 2021-07-31 PROCEDURE — 90471 IMMUNIZATION ADMIN: CPT

## 2021-07-31 PROCEDURE — 12001 RPR S/N/AX/GEN/TRNK 2.5CM/<: CPT | Performed by: PHYSICIAN ASSISTANT

## 2021-07-31 PROCEDURE — 99282 EMERGENCY DEPT VISIT SF MDM: CPT | Performed by: PHYSICIAN ASSISTANT

## 2021-07-31 RX ORDER — LIDOCAINE HYDROCHLORIDE AND EPINEPHRINE 10; 10 MG/ML; UG/ML
20 INJECTION, SOLUTION INFILTRATION; PERINEURAL ONCE
Status: COMPLETED | OUTPATIENT
Start: 2021-07-31 | End: 2021-07-31

## 2021-07-31 RX ORDER — TRANEXAMIC ACID 650 1/1
1300 TABLET ORAL 3 TIMES DAILY
COMMUNITY

## 2021-07-31 RX ORDER — GINSENG 100 MG
1 CAPSULE ORAL ONCE
Status: COMPLETED | OUTPATIENT
Start: 2021-07-31 | End: 2021-07-31

## 2021-07-31 RX ADMIN — TETANUS TOXOID, REDUCED DIPHTHERIA TOXOID AND ACELLULAR PERTUSSIS VACCINE, ADSORBED 0.5 ML: 5; 2.5; 8; 8; 2.5 SUSPENSION INTRAMUSCULAR at 15:12

## 2021-07-31 RX ADMIN — BACITRACIN 1 SMALL APPLICATION: 500 OINTMENT TOPICAL at 15:12

## 2021-07-31 RX ADMIN — LIDOCAINE HYDROCHLORIDE,EPINEPHRINE BITARTRATE 20 ML: 10; .01 INJECTION, SOLUTION INFILTRATION; PERINEURAL at 15:11

## 2021-07-31 NOTE — ED PROVIDER NOTES
History  Chief Complaint   Patient presents with    Hand Laceration     States was cutting her hair and cut her left hand with scissors  2 cm laceration left palm      57-year-old female presents with left hand laceration x1 hour  She was cutting her hair with sharp scissors when accidentally cut her palm  She sustained a 2 cm laceration to her left palm  She states that she tried holding pressure however states that the wound did not close so she came in for further evaluation  Tetanus not up-to-date  She is right-hand dominant  No numbness or tingling  No swelling or bruising  No other injuries or complaints  Prior to Admission Medications   Prescriptions Last Dose Informant Patient Reported? Taking? Multiple Vitamins-Minerals (MULTIVITAMIN ADULT PO) 2021 at Unknown time  Yes Yes   Sig: Take 1 tablet by mouth daily   Tranexamic Acid 650 MG TABS Past Month at Unknown time Self Yes Yes   Sig: Take 1,300 mg by mouth 3 (three) times a day Takes for 3 days during menstrual cycle   naproxen (NAPROSYN) 500 mg tablet 2021 at Unknown time  Yes Yes   Si (two) times a day      Facility-Administered Medications: None       History reviewed  No pertinent past medical history  Past Surgical History:   Procedure Laterality Date     SECTION      EYE SURGERY      lasik    UTERINE FIBROID SURGERY      WISDOM TOOTH EXTRACTION         History reviewed  No pertinent family history  I have reviewed and agree with the history as documented  E-Cigarette/Vaping    E-Cigarette Use Never User      E-Cigarette/Vaping Substances     Social History     Tobacco Use    Smoking status: Never Smoker    Smokeless tobacco: Never Used   Vaping Use    Vaping Use: Never used   Substance Use Topics    Alcohol use: Yes     Comment: rare     Drug use: No       Review of Systems   Constitutional: Negative for chills and fever  HENT: Negative for sneezing and sore throat      Respiratory: Negative for cough and shortness of breath  Cardiovascular: Negative for chest pain, palpitations and leg swelling  Gastrointestinal: Negative for abdominal pain, constipation, diarrhea, nausea and vomiting  Musculoskeletal: Negative for back pain, gait problem and joint swelling  Skin: Positive for wound  Negative for color change, pallor and rash  Neurological: Negative for dizziness, syncope, weakness, light-headedness, numbness and headaches  All other systems reviewed and are negative  Physical Exam  Physical Exam  Vitals and nursing note reviewed  Constitutional:       General: She is not in acute distress  Appearance: Normal appearance  She is well-developed and normal weight  She is not diaphoretic  HENT:      Head: Normocephalic and atraumatic  Nose: Nose normal    Eyes:      Extraocular Movements: Extraocular movements intact  Conjunctiva/sclera: Conjunctivae normal    Cardiovascular:      Rate and Rhythm: Normal rate and regular rhythm  Pulses: Normal pulses  Heart sounds: Normal heart sounds  No murmur heard  No friction rub  No gallop  Pulmonary:      Effort: Pulmonary effort is normal  No respiratory distress  Breath sounds: Normal breath sounds  No stridor  No wheezing or rales  Comments: Sp02 is 98% indicating adequate oxygenation on room air  Musculoskeletal:         General: Normal range of motion  Hands:       Cervical back: Normal range of motion  Skin:     General: Skin is warm  Capillary Refill: Capillary refill takes less than 2 seconds  Coloration: Skin is not pale  Findings: No erythema or rash  Neurological:      Mental Status: She is alert  Mental status is at baseline           Vital Signs  ED Triage Vitals [07/31/21 1440]   Temperature Pulse Respirations Blood Pressure SpO2   97 5 °F (36 4 °C) 73 18 113/60 98 %      Temp Source Heart Rate Source Patient Position - Orthostatic VS BP Location FiO2 (%)   Tympanic Monitor Sitting Left arm --      Pain Score       --           Vitals:    07/31/21 1440   BP: 113/60   Pulse: 73   Patient Position - Orthostatic VS: Sitting         Visual Acuity      ED Medications  Medications   lidocaine-epinephrine (XYLOCAINE/EPINEPHRINE) 1 %-1:100,000 injection 20 mL (20 mL Infiltration Given 7/31/21 1511)   bacitracin topical ointment 1 small application (1 small application Topical Given 7/31/21 1512)   tetanus-diphtheria-acellular pertussis (BOOSTRIX) IM injection 0 5 mL (0 5 mL Intramuscular Given 7/31/21 1512)       Diagnostic Studies  Results Reviewed     None                 No orders to display              Procedures  Laceration repair    Date/Time: 7/31/2021 4:07 PM  Performed by: Beulah Minor PA-C  Authorized by: Beulah Minor PA-C   Consent: Verbal consent obtained  Risks and benefits: risks, benefits and alternatives were discussed  Consent given by: patient  Patient understanding: patient states understanding of the procedure being performed  Patient consent: the patient's understanding of the procedure matches consent given  Procedure consent: procedure consent matches procedure scheduled  Relevant documents: relevant documents present and verified  Test results: test results available and properly labeled  Site marked: the operative site was marked  Radiology Images displayed and confirmed   If images not available, report reviewed: imaging studies available  Required items: required blood products, implants, devices, and special equipment available  Patient identity confirmed: verbally with patient  Body area: upper extremity  Location details: left hand  Laceration length: 2 cm  Foreign bodies: no foreign bodies  Tendon involvement: none  Nerve involvement: none  Vascular damage: no  Anesthesia: local infiltration    Anesthesia:  Local Anesthetic: lidocaine 1% with epinephrine  Anesthetic total: 2 mL    Wound Dehiscence:  Superficial Wound Dehiscence: simple closure      Procedure Details:  Preparation: Patient was prepped and draped in the usual sterile fashion  Irrigation solution: saline  Irrigation method: syringe  Amount of cleaning: extensive  Debridement: none  Degree of undermining: none  Skin closure: Ethilon (4-0)  Number of sutures: 5  Technique: simple  Approximation: close  Approximation difficulty: simple  Dressing: antibiotic ointment and pressure dressing  Patient tolerance: patient tolerated the procedure well with no immediate complications               ED Course                                           MDM  Number of Diagnoses or Management Options  Hand laceration  Diagnosis management comments: Wound extensively irrigated  Sutures applied without difficulty or complication  Applied bacitracin ointment and wrapped, can continue to do so at home  Return in 10-14 days for suture removal or earlier if any signs of infection  Gave patient proper education regarding diagnosis  Answered all questions  Return to ED for any worsening of symptoms otherwise follow up with primary care physician for re-evaluation  Discussed plan with patient who verbalized understanding and agreed to plan  Amount and/or Complexity of Data Reviewed  Review and summarize past medical records: yes  Discuss the patient with other providers: yes        Disposition  Final diagnoses:   Hand laceration     Time reflects when diagnosis was documented in both MDM as applicable and the Disposition within this note     Time User Action Codes Description Comment    7/31/2021  4:09 PM Renan Rod Add [P42 025A] Hand laceration       ED Disposition     ED Disposition Condition Date/Time Comment    Discharge Stable Sat Jul 31, 2021  4:09 PM Haley Rincon discharge to home/self care              Follow-up Information     Follow up With Specialties Details Why Contact Info Additional [address] Emergency Department Emergency Medicine Go in 10 days For suture removal in 10-14 days [address] [address]  [address] [place] [address] Emergency Department, [place], [place], [address] [facility] Emergency Department Emergency Medicine Go in 10 days For suture removal in 10-14 days [address] [address]  [address] [place] [address] Emergency Department, [place], [place], [address]          Discharge Medication List as of 7/31/2021  4:10 PM      CONTINUE these medications which have NOT CHANGED    Details   Multiple Vitamins-Minerals (MULTIVITAMIN ADULT PO) Take 1 tablet by mouth daily, Starting Thu 12/14/2017, Historical Med      naproxen (NAPROSYN) 500 mg tablet 2 (two) times a day, Starting Tue 8/28/2018, Historical Med      Tranexamic Acid 650 MG TABS Take 1,300 mg by mouth 3 (three) times a day Takes for 3 days during menstrual cycle, Historical Med           No discharge procedures on file      PDMP Review     None          ED Provider  Electronically Signed by           Franklin Chapman PA-C  07/31/21 6168

## 2021-08-02 ENCOUNTER — TELEPHONE (OUTPATIENT)
Dept: FAMILY MEDICINE CLINIC | Facility: CLINIC | Age: 36
End: 2021-08-02

## 2021-08-02 ENCOUNTER — VBI (OUTPATIENT)
Dept: FAMILY MEDICINE CLINIC | Facility: CLINIC | Age: 36
End: 2021-08-02

## 2021-08-02 NOTE — TELEPHONE ENCOUNTER
Patient has not been seen since 08/2018  Called to verify pcp  She will need a CPE  LMOM for a call back     Blanca Blevins MA

## 2021-08-02 NOTE — TELEPHONE ENCOUNTER
Severiano Darner    ED Visit Information     Ed visit date: 07/31/2021  Diagnosis Description: Hand laceration  In Network? Yes Kavita Gonzales  Discharge status: Home  Discharged with meds ? No  Number of ED visits to date: 0  ED Severity:NA     Outreach Information    Outreach successful: Yes 1  Date letter mailed:NA  Date Finalized:08/02/2021    Care Coordination    Follow up appointment with pcp: yes yes  Transportation issues ? NA    Value Base Outreach    08/02/2021 11:23 AM - LMOM detailed and ?  Still consider [facility]

## 2021-08-10 ENCOUNTER — OFFICE VISIT (OUTPATIENT)
Dept: FAMILY MEDICINE CLINIC | Facility: CLINIC | Age: 36
End: 2021-08-10
Payer: COMMERCIAL

## 2021-08-10 VITALS
RESPIRATION RATE: 16 BRPM | SYSTOLIC BLOOD PRESSURE: 114 MMHG | DIASTOLIC BLOOD PRESSURE: 70 MMHG | TEMPERATURE: 99 F | HEIGHT: 66 IN | WEIGHT: 202 LBS | BODY MASS INDEX: 32.47 KG/M2 | HEART RATE: 80 BPM

## 2021-08-10 DIAGNOSIS — S61.412D LACERATION OF LEFT HAND WITHOUT FOREIGN BODY, SUBSEQUENT ENCOUNTER: Primary | ICD-10-CM

## 2021-08-10 PROCEDURE — 99213 OFFICE O/P EST LOW 20 MIN: CPT | Performed by: NURSE PRACTITIONER

## 2021-08-10 RX ORDER — TRANEXAMIC ACID 650 1/1
1300 TABLET ORAL 3 TIMES DAILY PRN
COMMUNITY
Start: 2021-04-19 | End: 2022-03-24

## 2021-08-10 RX ORDER — MOMETASONE FUROATE 1 MG/ML
SOLUTION TOPICAL
COMMUNITY

## 2021-08-10 NOTE — PROGRESS NOTES
Assessment/Plan:    Laceration repair    Date/Time: 8/10/2021 8:55 AM  Performed by: VICTOR HUGO Armendariz  Authorized by: VICTOR HUGO Armendariz   Body area: upper extremity  Location details: left hand  Foreign bodies: no foreign bodies  Tendon involvement: none  Nerve involvement: none  Vascular damage: no    Wound Dehiscence:  Superficial Wound Dehiscence: simple closure      Procedure Details:  Preparation: Patient was prepped and draped in the usual sterile fashion  Debridement: none  Degree of undermining: none  Skin closure: Steri-Strips  Patient tolerance: patient tolerated the procedure well with no immediate complications  Comments: 5 sutures removed from wound  5mm area that is still open superficially  2 steri strips applied          1  Laceration of left hand without foreign body, subsequent encounter      BMI Counseling: Body mass index is 32 36 kg/m²  The BMI is above normal  Nutrition recommendations include consuming healthier snacks  Exercise recommendations include moderate physical activity 150 minutes/week  There are no Patient Instructions on file for this visit  Return if symptoms worsen or fail to improve  Subjective:      Patient ID: Dorene Cowart is a 43 y o  female  Chief Complaint   Patient presents with    Suture / Staple Removal     Suture removal left hand  Has 5 Intact sutures for 10 days JMoyleLPN       Here today for suture removal   She cut her left hand when cutting her hair  She was evaluated in the ER and wound was sutured  Tdap was given  She denies any pain, numbness or tingling in her hand  No problems with wound healing      The following portions of the patient's history were reviewed and updated as appropriate: allergies, current medications, past family history, past medical history, past social history, past surgical history and problem list     Review of Systems   Constitutional: Negative  Skin: Positive for wound           Current Outpatient Medications   Medication Sig Dispense Refill    mometasone (ELOCON) 0 1 % lotion Apply topically      Multiple Vitamins-Minerals (MULTIVITAMIN ADULT PO) Take 1 tablet by mouth daily      naproxen (NAPROSYN) 500 mg tablet 2 (two) times a day      Tranexamic Acid 650 MG TABS Take 1,300 mg by mouth 3 (three) times a day Takes for 3 days during menstrual cycle      Tranexamic Acid 650 MG TABS Take 1,300 mg by mouth Three times daily as needed (Patient not taking: Reported on 8/10/2021)       No current facility-administered medications for this visit  Objective:    /70   Pulse 80   Temp 99 °F (37 2 °C)   Resp 16   Ht 5' 6 25" (1 683 m)   Wt 91 6 kg (202 lb)   LMP 07/24/2021 (Approximate)   BMI 32 36 kg/m²        Physical Exam  Vitals and nursing note reviewed  Constitutional:       Appearance: Normal appearance  She is well-developed  Cardiovascular:      Rate and Rhythm: Normal rate and regular rhythm  Heart sounds: Normal heart sounds  No murmur heard  Pulmonary:      Effort: Pulmonary effort is normal       Breath sounds: Normal breath sounds  Skin:     General: Skin is warm and dry  Comments: Left hand thenar eminence with laceration healing well, sutures intact  No erythema or drainage   Neurological:      Mental Status: She is alert     Psychiatric:         Mood and Affect: Mood normal          Behavior: Behavior normal                 VICTOR HUGO Renee

## 2021-10-29 ENCOUNTER — IMMUNIZATIONS (OUTPATIENT)
Dept: FAMILY MEDICINE CLINIC | Facility: HOSPITAL | Age: 36
End: 2021-10-29

## 2021-10-29 DIAGNOSIS — Z23 ENCOUNTER FOR IMMUNIZATION: Primary | ICD-10-CM

## 2022-03-24 ENCOUNTER — OFFICE VISIT (OUTPATIENT)
Dept: FAMILY MEDICINE CLINIC | Facility: CLINIC | Age: 37
End: 2022-03-24
Payer: COMMERCIAL

## 2022-03-24 ENCOUNTER — TELEPHONE (OUTPATIENT)
Dept: DERMATOLOGY | Facility: CLINIC | Age: 37
End: 2022-03-24

## 2022-03-24 VITALS
DIASTOLIC BLOOD PRESSURE: 70 MMHG | SYSTOLIC BLOOD PRESSURE: 104 MMHG | BODY MASS INDEX: 31.82 KG/M2 | HEIGHT: 66 IN | HEART RATE: 76 BPM | RESPIRATION RATE: 16 BRPM | WEIGHT: 198 LBS | TEMPERATURE: 98.2 F

## 2022-03-24 DIAGNOSIS — B35.0 SCALP DERMATOPHYTOSIS: Primary | ICD-10-CM

## 2022-03-24 PROCEDURE — 99213 OFFICE O/P EST LOW 20 MIN: CPT | Performed by: NURSE PRACTITIONER

## 2022-03-24 PROCEDURE — 3008F BODY MASS INDEX DOCD: CPT | Performed by: NURSE PRACTITIONER

## 2022-03-24 PROCEDURE — 1036F TOBACCO NON-USER: CPT | Performed by: NURSE PRACTITIONER

## 2022-03-24 PROCEDURE — 3725F SCREEN DEPRESSION PERFORMED: CPT | Performed by: NURSE PRACTITIONER

## 2022-03-24 RX ORDER — KETOCONAZOLE 20 MG/ML
1 SHAMPOO TOPICAL ONCE
Qty: 120 ML | Refills: 1 | Status: SHIPPED | OUTPATIENT
Start: 2022-03-24 | End: 2022-03-24

## 2022-03-24 NOTE — TELEPHONE ENCOUNTER
Patient left voicemail wanting a call back in regards to making an appointment  I called patient back but no answer and left voicemail with our call back number  Advised patient to call back at earliest convenience to schedule the appointment    Also informed them of the wait list

## 2022-03-24 NOTE — PROGRESS NOTES
Assessment/Plan:    Will treat with 2 week course of Ketoconazole shampoo  Consider steroid shampoo treatment if ineffective  1  Scalp dermatophytosis  -     ketoconazole (NIZORAL) 2 % shampoo; Apply 1 application topically once for 1 dose            There are no Patient Instructions on file for this visit  No follow-ups on file  Subjective:      Patient ID: Magdalena Mckeon is a 42 y o  female  Chief Complaint   Patient presents with    Hair/Scalp Problem     C/O dry, itchy scalp JMoyleLPN       For the past year, she has noticed itchy, dry, and flaking scalp  She has not been able to identify any triggers  Not associated with any type of shampoo or bath product  She chemically straightens her hair every 3 months, which she has been doing since her 25s and has never had a problem  She colors her hair, but no different products  No visible rashes  She has 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, which is chronic, but no other body rashes  Has tried different medicated shampoos OTC which have not been effective  The following portions of the patient's history were reviewed and updated as appropriate: allergies, current medications, past family history, past medical history, past social history, past surgical history and problem list     Review of Systems   Constitutional: Negative  HENT: Negative      Skin:        See HPI         Current Outpatient Medications   Medication Sig Dispense Refill    mometasone (ELOCON) 0 1 % lotion Apply topically      Multiple Vitamins-Minerals (MULTIVITAMIN ADULT PO) Take 1 tablet by mouth daily      naproxen (NAPROSYN) 500 mg tablet 2 (two) times a day      Oxymetazoline HCl (AFRIN 12 HOUR NA) PRN       Tranexamic Acid 650 MG TABS Take 1,300 mg by mouth 3 (three) times a day Takes for 3 days during menstrual cycle      Triamcinolone Acetonide (NASACORT ALLERGY 24HR NA) PRN       ketoconazole (NIZORAL) 2 % shampoo Apply 1 application topically once for 1 dose 120 mL 1     No current facility-administered medications for this visit  Objective:    /70   Pulse 76   Temp 98 2 °F (36 8 °C)   Resp 16   Ht 5' 6 25" (1 683 m)   Wt 89 8 kg (198 lb)   LMP 03/19/2022   BMI 31 72 kg/m²        Physical Exam  Vitals and nursing note reviewed  Constitutional:       Appearance: Normal appearance  She is well-developed  Cardiovascular:      Rate and Rhythm: Normal rate and regular rhythm  Heart sounds: Normal heart sounds  No murmur heard  Pulmonary:      Effort: Pulmonary effort is normal       Breath sounds: Normal breath sounds  Skin:     General: Skin is warm and dry  Comments: No visible scalp rashes or significant erythema  Mild flaking visible diffusely   Neurological:      Mental Status: She is alert     Psychiatric:         Mood and Affect: Mood normal          Behavior: Behavior normal                 VICTOR HUGO Mckeon

## 2022-04-05 ENCOUNTER — PATIENT MESSAGE (OUTPATIENT)
Dept: FAMILY MEDICINE CLINIC | Facility: CLINIC | Age: 37
End: 2022-04-05

## 2022-04-05 DIAGNOSIS — L98.9 DERMATOSIS OF SCALP: Primary | ICD-10-CM

## 2022-04-06 RX ORDER — CLOBETASOL PROPIONATE 0.46 MG/ML
SOLUTION TOPICAL 2 TIMES DAILY
Qty: 50 ML | Refills: 0 | Status: SHIPPED | OUTPATIENT
Start: 2022-04-06 | End: 2022-04-14 | Stop reason: SDUPTHER

## 2022-04-14 DIAGNOSIS — L98.9 DERMATOSIS OF SCALP: ICD-10-CM

## 2022-04-14 RX ORDER — CLOBETASOL PROPIONATE 0.46 MG/ML
SOLUTION TOPICAL 2 TIMES DAILY
Qty: 50 ML | Refills: 0 | Status: SHIPPED | OUTPATIENT
Start: 2022-04-14

## 2022-06-29 ENCOUNTER — APPOINTMENT (OUTPATIENT)
Dept: RADIOLOGY | Facility: CLINIC | Age: 37
End: 2022-06-29
Payer: COMMERCIAL

## 2022-06-29 ENCOUNTER — OFFICE VISIT (OUTPATIENT)
Dept: OBGYN CLINIC | Facility: CLINIC | Age: 37
End: 2022-06-29
Payer: COMMERCIAL

## 2022-06-29 VITALS
BODY MASS INDEX: 31.82 KG/M2 | DIASTOLIC BLOOD PRESSURE: 74 MMHG | HEIGHT: 66 IN | WEIGHT: 198 LBS | HEART RATE: 80 BPM | SYSTOLIC BLOOD PRESSURE: 114 MMHG

## 2022-06-29 DIAGNOSIS — M54.2 NECK PAIN ON RIGHT SIDE: ICD-10-CM

## 2022-06-29 DIAGNOSIS — M62.838 TRAPEZIUS MUSCLE SPASM: Primary | ICD-10-CM

## 2022-06-29 PROCEDURE — 1036F TOBACCO NON-USER: CPT | Performed by: ORTHOPAEDIC SURGERY

## 2022-06-29 PROCEDURE — 72040 X-RAY EXAM NECK SPINE 2-3 VW: CPT

## 2022-06-29 PROCEDURE — 3008F BODY MASS INDEX DOCD: CPT | Performed by: ORTHOPAEDIC SURGERY

## 2022-06-29 PROCEDURE — 99203 OFFICE O/P NEW LOW 30 MIN: CPT | Performed by: ORTHOPAEDIC SURGERY

## 2022-06-29 RX ORDER — NAPROXEN SODIUM 550 MG/1
TABLET ORAL
COMMUNITY
Start: 2022-05-28

## 2022-06-29 NOTE — PROGRESS NOTES
Assessment/Plan:  1  Neck pain on right side  XR spine cervical 2 or 3 vw injury       Scribe Attestation    I,:  Nick Tapia am acting as a scribe while in the presence of the attending physician :       I,:  Jordan Harkins, DO personally performed the services described in this documentation    as scribed in my presence :           Amari Lau is presenting with signs and symptoms with trapezius spasm  This will respond well to physical therapy  A script was provided today  She will attend therapy for the next 4-6 weeks  If she shows no improvement, has a worsening of her pain or develops radicular symptoms she should return to see me  Other treatment options would be prescription for muscle relaxants  She had no further questions  Subjective:   Rosalie Calvo is a 42 y o  female who presents to the office today for evaluation of chronic neck pain  Amari Lau states she 1st began with right-sided neck pain approximately 5 years ago  She attributed this to lifting a toddler repeatedly  She did modify her activities but unfortunately her neck pain did not resolve  This is the 1st time she has he can treatment for this  Her chief complaint is of a persistent mild ache in the right trapezius region that will radiate into the neck and upper back  Lifting anything of significant weight will cause her pain to increase  She has used massage with some relief  She will take ibuprofen or Naprosyn 5-6 days per week  She denies numbness and tingling into the hand and fingers  Review of Systems   Constitutional: Negative for chills, fever and unexpected weight change  HENT: Negative for hearing loss, nosebleeds and sore throat  Eyes: Negative for pain, redness and visual disturbance  Respiratory: Negative for cough, shortness of breath and wheezing  Cardiovascular: Negative for chest pain, palpitations and leg swelling  Gastrointestinal: Negative for abdominal pain, nausea and vomiting     Endocrine: Negative for polydipsia and polyuria  Genitourinary: Negative for dysuria and hematuria  Musculoskeletal:        See HPI   Skin: Negative for rash and wound  Neurological: Negative for dizziness, numbness and headaches  Psychiatric/Behavioral: Negative for decreased concentration and suicidal ideas  The patient is not nervous/anxious  No past medical history on file  Past Surgical History:   Procedure Laterality Date     SECTION      EYE SURGERY      lasik    UTERINE FIBROID SURGERY      WISDOM TOOTH EXTRACTION         No family history on file      Social History     Occupational History    Not on file   Tobacco Use    Smoking status: Never Smoker    Smokeless tobacco: Never Used   Vaping Use    Vaping Use: Never used   Substance and Sexual Activity    Alcohol use: Yes     Comment: rare     Drug use: No    Sexual activity: Not on file         Current Outpatient Medications:     clobetasol (TEMOVATE) 0 05 % external solution, Apply topically 2 (two) times a day, Disp: 50 mL, Rfl: 0    mometasone (ELOCON) 0 1 % lotion, Apply topically, Disp: , Rfl:     Multiple Vitamins-Minerals (MULTIVITAMIN ADULT PO), Take 1 tablet by mouth daily, Disp: , Rfl:     naproxen (NAPROSYN) 500 mg tablet, 2 (two) times a day, Disp: , Rfl:     naproxen sodium (ANAPROX) 550 mg tablet, , Disp: , Rfl:     Oxymetazoline HCl (AFRIN 12 HOUR NA), PRN , Disp: , Rfl:     Tranexamic Acid 650 MG TABS, Take 1,300 mg by mouth 3 (three) times a day Takes for 3 days during menstrual cycle, Disp: , Rfl:     Triamcinolone Acetonide (NASACORT ALLERGY 24HR NA), PRN , Disp: , Rfl:     ketoconazole (NIZORAL) 2 % shampoo, Apply 1 application topically once for 1 dose, Disp: 120 mL, Rfl: 1    Allergies   Allergen Reactions    Amoxicillin Other (See Comments)     Vaginal bleeding     Cat Hair Extract Itching, Nasal Congestion and Sneezing       Objective:  Vitals:    22 1325   BP: 114/74   Pulse: 80       Ortho Exam  Inspection of the neck found the skin to be of normal color and temperature  Cervical range of motion is within normal limits  She is tender in the right trapezius region with notable spasm  The upper extremities are neurovascularly intact with normal sensation to light touch and 2+ radial pulses  She has normal motor function about the hand intrinsics  Physical Exam  Vitals reviewed  Constitutional:       Appearance: She is well-developed  HENT:      Head: Normocephalic and atraumatic  Eyes:      General:         Right eye: No discharge  Left eye: No discharge  Conjunctiva/sclera: Conjunctivae normal    Cardiovascular:      Rate and Rhythm: Regular rhythm  Pulmonary:      Effort: Pulmonary effort is normal  No respiratory distress  Breath sounds: No stridor  Musculoskeletal:      Cervical back: Normal range of motion and neck supple  Skin:     General: Skin is warm and dry  Neurological:      Mental Status: She is alert and oriented to person, place, and time  Psychiatric:         Behavior: Behavior normal          I have personally reviewed pertinent films in PACS and my interpretation is as follows:  X-rays of the cervical spine taken today were reviewed  There is evidence of loss of cervical lordosis  There are no other abnormalities

## 2022-07-01 DIAGNOSIS — M62.838 TRAPEZIUS MUSCLE SPASM: Primary | ICD-10-CM

## 2022-07-01 RX ORDER — CYCLOBENZAPRINE HCL 10 MG
10 TABLET ORAL 3 TIMES DAILY PRN
Qty: 20 TABLET | Refills: 0 | Status: SHIPPED | OUTPATIENT
Start: 2022-07-01 | End: 2023-08-08 | Stop reason: ALTCHOICE

## 2022-07-11 ENCOUNTER — OFFICE VISIT (OUTPATIENT)
Dept: DERMATOLOGY | Age: 37
End: 2022-07-11
Payer: COMMERCIAL

## 2022-07-11 VITALS — HEIGHT: 66 IN | BODY MASS INDEX: 32.05 KG/M2 | TEMPERATURE: 98.4 F | WEIGHT: 199.4 LBS

## 2022-07-11 DIAGNOSIS — L21.9 SEBORRHEIC DERMATITIS: Primary | ICD-10-CM

## 2022-07-11 PROCEDURE — 99203 OFFICE O/P NEW LOW 30 MIN: CPT | Performed by: DERMATOLOGY

## 2022-07-11 NOTE — PATIENT INSTRUCTIONS
SEBORRHEIC DERMATITIS    Assessment and Plan:  Based on a thorough discussion of this condition and the management approach to it (including a comprehensive discussion of the known risks, side effects and potential benefits of treatment), the patient (family) agrees to implement the following specific plan:  Scalp biopsy recommended and scheduled  Reassured the scalp can develop allergies   Advised to stop all topical medications to the scalp   Avoid aspirin at least 1 week prior to biopsy

## 2022-07-11 NOTE — PROGRESS NOTES
[facility] Note     Patient Name: Rc Barbosa  Encounter Date: 07/11/2022     Have you been cared for by a [facility] in the last 3 years and, if so, which one? No    · Have you traveled outside of the [address] [place] [address] in the past 3 months or outside of the [facility] area in the last 2 weeks? No     May we call your Preferred Phone number to discuss your specific medical information? Yes     May we leave a detailed message that includes your specific medical information? Yes      Today's Chief Concerns:   Concern #1:  Itchy scalp        Past Medical History:  Have you personally ever had or currently have any of the following? · Skin cancer (such as Melanoma, Basal Cell Carcinoma, Squamous Cell Carcinoma? (If Yes, please provide more detail)- No  · Eczema: No  · Psoriasis: No  · HIV/AIDS: No  · Hepatitis B or C: No  · Tuberculosis: No  · Systemic Immunosuppression such as Diabetes, Biologic or Immunotherapy, Chemotherapy, Organ Transplantation, Bone Marrow Transplantation (If YES, please provide more detail): No  · Radiation Treatment (If YES, please provide more detail): No  · Any other major medical conditions/concerns? 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 N/a     Family History:  Have any of your "first degree relatives" (parent, brother, sister, or child) had any of the following       · Skin cancer such as Melanoma or Merkel Cell Carcinoma or Pancreatic Cancer? No  · Eczema, Asthma, Hay Fever or Seasonal Allergies: No  · Psoriasis or Psoriatic Arthritis: No  · Do any other medical conditions seem to run in your family? If Yes, what condition and which relatives?   No    Current Medications:   (please update all dermatological medications before printing patient's AVS!)      Current Outpatient Medications:     cyclobenzaprine (FLEXERIL) 10 mg tablet, Take 1 tablet (10 mg total) by mouth 3 (three) times a day as needed for muscle spasms, Disp: 20 tablet, Rfl: 0    mometasone (ELOCON) 0 1 % lotion, Apply topically, Disp: , Rfl:     Multiple Vitamins-Minerals (MULTIVITAMIN ADULT PO), Take 1 tablet by mouth daily, Disp: , Rfl:     naproxen sodium (ANAPROX) 550 mg tablet, , Disp: , Rfl:     Oxymetazoline HCl (AFRIN 12 HOUR NA), PRN , Disp: , Rfl:     Tranexamic Acid 650 MG TABS, Take 1,300 mg by mouth 3 (three) times a day Takes for 3 days during menstrual cycle, Disp: , Rfl:     Triamcinolone Acetonide (NASACORT ALLERGY 24HR NA), PRN , Disp: , Rfl:     clobetasol (TEMOVATE) 0 05 % external solution, Apply topically 2 (two) times a day, Disp: 50 mL, Rfl: 0    ketoconazole (NIZORAL) 2 % shampoo, Apply 1 application topically once for 1 dose, Disp: 120 mL, Rfl: 1    naproxen (NAPROSYN) 500 mg tablet, 2 (two) times a day (Patient not taking: Reported on 7/11/2022), Disp: , Rfl:       Review of Systems:  Have you recently had or currently have any of the following? If YES, what are you doing for the problem? · Fever, chills or unintended weight loss: No  · Sudden loss or change in your vision: No  · Nausea, vomiting or blood in your stool: No  · Painful or swollen joints: No  · Wheezing or cough: No  · Changing mole or non-healing wound: No  · Nosebleeds: No  · Excessive sweating: No  · Easy or prolonged bleeding? No  · Over the last 2 weeks, how often have you been bothered by the following problems? · Taking little interest or pleasure in doing things: 1 - Not at All  · Feeling down, depressed, or hopeless: 1 - Not at All  · Rapid heartbeat with epinephrine:  No    · FEMALES ONLY:    · Are you pregnant or planning to become pregnant? No  · Are you currently or planning to be nursing or breast feeding? No    · Any known allergies?       Allergies   Allergen Reactions    Amoxicillin Other (See Comments)     Vaginal bleeding     Cat Hair Extract Itching, Nasal Congestion and Sneezing   ·       Physical Exam:     Was a chaperone (Derm Clinical Assistant) present throughout the entire Physical Exam? Yes      CONSTITUTIONAL:   Vitals:    07/11/22 0809   Temp: 98 4 °F (36 9 °C)   TempSrc: Temporal   Weight: 90 4 kg (199 lb 6 4 oz)   Height: 5' 6" (1 676 m)       PSYCH: Normal mood and affect  EYES: Normal conjunctiva  ENT: Normal lips and oral mucosa  CARDIOVASCULAR: No edema  RESPIRATORY: Normal respirations  HEME/LYMPH/IMMUNO:  No regional lymphadenopathy except as noted below in "ASSESSMENT AND PLAN BY DIAGNOSIS"    SKIN:  FULL ORGAN SYSTEM EXAM   Hair, Scalp, Ears, Face Normal except as noted below in Assessment   Neck, Cervical Chain Nodes Normal except as noted below in Assessment   Right Arm/Hand/Fingers Normal except as noted below in Assessment   Left Arm/Hand/Fingers Normal except as noted below in Assessment        Assessment and Plan by Diagnosis:    History of Present Condition:     Duration:  How long has this been an issue for you?    o  over 1 year    Location Affected:  Where on the body is this affecting you? o  scalp    Quality:  Is there any bleeding, pain, itch, burning/irritation, or redness associated with the skin lesion? o  itchy, scaly with scab and foul odor    Severity:  Describe any bleeding, pain, itch, burning/irritation, or redness on a scale of 1 to 10 (with 10 being the worst)  o     Timing:  Does this condition seem to be there pretty constantly or do you notice it more at specific times throughout the day?     o  constant    Context:  Have you ever noticed that this condition seems to be associated with specific activities you do?    o  denies    Modifying Factors:    o Anything that seems to make the condition worse?    -  tried ketoconazole shampoo, clobetasol sol, nizoral shampoo and nizoral psoriasis   o What have you tried to do to make the condition better?    -  denies    Associated Signs and Symptoms: Does this skin lesion seem to be associated with any of the following:  o  SL AMB DERM SIGNS AND SYMPTOMS: Itching and Scratching     SEBORRHEIC DERMATITIS r/o contact r/o psoriasis    Physical Exam:   Anatomic Location Affected:  Scalp    Morphological Description:  Erythema no scale  Nails and elbows clear    Pertinent Positives:   Pertinent Negatives: Additional History of Present Condition:  Started over 1 year ago  Tried clobetasol sol, ketoconazole shampooo and nizoral shampoo  No improvement   No hx or fam hx of psoriasis     Assessment and Plan:  Based on a thorough discussion of this condition and the management approach to it (including a comprehensive discussion of the known risks, side effects and potential benefits of treatment), the patient (family) agrees to implement the following specific plan:   Scalp biopsy recommended and scheduled   Reassured the scalp can develop allergies    Advised to stop all topical medications to the scalp    Avoid aspirin at least 1 week prior to biopsy       Seborrheic Dermatitis   Seborrheic dermatitis is a common, chronic or relapsing form of eczema/dermatitis that mainly affects the sebaceous, gland-rich regions of the scalp, face, and trunk  There are infantile and adult forms of seborrhoeic dermatitis  It is sometimes associated with psoriasis and, in that clinical scenario, may be referred to as "sebo-psoriasis "  Seborrheic dermatitis is also known as "seborrheic eczema "  Dandruff (also called "pityriasis capitis") is an uninflamed form of seborrhoeic dermatitis  Dandruff presents as bran-like scaly patches scattered within hair-bearing areas of the scalp  In an infant, this condition may be referred to as "cradle cap "  The cause of seborrheic dermatitis is not completely understood  It is associated with proliferation of various species of the skin commensal Malassezia, in its yeast (non-pathogenic) form   Its metabolites (such as the fatty acids oleic acid, malssezin, and indole-3-carbaldehyde) may cause an inflammatory reaction  Differences in skin barrier lipid content and function may account for individual presentations  Infantile Seborrheic Dermatitis  Infantile seborrheic dermatitis affects babies under the age of 1 months and usually resolves by 1012 months of age  Infantile seborrheic dermatitis causes "cradle cap" (diffuse, greasy scaling on scalp)  The rash may spread to affect armpit and groin folds (a type of "napkin dermatitis")  There may be associated salmon-pink colored patches that may flake or peel  The rash in this case is usually not especially itchy, so the baby often appears undisturbed by the rash, even when more generalized  Adult Seborrheic Dermatitis  Adult seborrheic dermatitis tends to begin in late adolescence; prevalence is greatest in young adults and in the elderly  It is more common in males than in females  The following factors are sometimes associated with severe adult seborrheic dermatitis:   Oily skin   Familial tendency to seborrhoeic dermatitis or a family history of psoriasis   Immunosuppression: organ transplant recipient, human immunodeficiency virus (HIV) infection and patients with lymphoma   Neurological and psychiatric diseases: Parkinson disease, tardive dyskinesia, depression, epilepsy, facial nerve palsy, spinal cord injury and congenital disorders such as Down syndrome   Treatment for psoriasis with psoralen and ultraviolet A (PUVA) therapy   Lack of sleep   Stressful events  In adults, seborrheic dermatitis may typically affect the scalp, face (creases around the nose, behind ears, within eyebrows) and upper trunk   Typical clinical features include:   Winter flares, improving in summer following sun exposure   Minimal itch most of the time   Combination oily and dry mid-facial skin   Ill-defined localized scaly patches or diffuse scale in the scalp   Blepharitis; scaly red eyelid margins   Oto-pink, thin, scaly, and ill-defined plaques in skin folds on both sides of the face   Petal or ring-shaped flaky patches on hair-line and on anterior chest   Rash in armpits, under the breasts, in the groin folds and genital creases   Superficial folliculitis (inflamed hair follicles) on cheeks and upper trunk    Seborrheic dermatitis is diagnosed by its clinical appearance and behavior  Skin biopsy may be helpful but is rarely necessary to make this diagnosis      Scribe Attestation    I,:  Russell Larson am acting as a scribe while in the presence of the attending physician :       I,:  Gifty Loaiza MD personally performed the services described in this documentation    as scribed in my presence :

## 2022-10-13 ENCOUNTER — OFFICE VISIT (OUTPATIENT)
Dept: FAMILY MEDICINE CLINIC | Facility: CLINIC | Age: 37
End: 2022-10-13
Payer: COMMERCIAL

## 2022-10-13 VITALS
WEIGHT: 197 LBS | HEART RATE: 105 BPM | BODY MASS INDEX: 31.66 KG/M2 | DIASTOLIC BLOOD PRESSURE: 60 MMHG | HEIGHT: 66 IN | TEMPERATURE: 97.7 F | SYSTOLIC BLOOD PRESSURE: 100 MMHG | RESPIRATION RATE: 16 BRPM | OXYGEN SATURATION: 97 %

## 2022-10-13 DIAGNOSIS — R42 VERTIGO: ICD-10-CM

## 2022-10-13 DIAGNOSIS — Z13.29 SCREENING FOR THYROID DISORDER: ICD-10-CM

## 2022-10-13 DIAGNOSIS — Z13.6 SCREENING FOR CARDIOVASCULAR CONDITION: ICD-10-CM

## 2022-10-13 DIAGNOSIS — Z00.00 ROUTINE ADULT HEALTH MAINTENANCE: Primary | ICD-10-CM

## 2022-10-13 DIAGNOSIS — E61.1 IRON DEFICIENCY: ICD-10-CM

## 2022-10-13 DIAGNOSIS — E55.9 VITAMIN D INSUFFICIENCY: ICD-10-CM

## 2022-10-13 PROBLEM — R39.9 UTI SYMPTOMS: Status: RESOLVED | Noted: 2019-08-02 | Resolved: 2022-10-13

## 2022-10-13 PROCEDURE — 99395 PREV VISIT EST AGE 18-39: CPT | Performed by: NURSE PRACTITIONER

## 2022-10-13 RX ORDER — METOCLOPRAMIDE 5 MG/1
5 TABLET ORAL AS NEEDED
COMMUNITY
Start: 2022-08-15

## 2022-10-13 NOTE — PROGRESS NOTES
FAMILY PRACTICE HEALTH MAINTENANCE OFFICE VISIT  [facility]    NAME: Karen Pickard  AGE: 42 y o  SEX: female  : 1985     DATE: 10/13/2022    Assessment and Plan     1  Routine adult health maintenance    2  Vertigo  Comments:  f/u if no improvement with balance center   Orders:  -     Ambulatory Referral to Physical Therapy; Future    3  Screening for cardiovascular condition  -     CBC and differential; Future  -     Comprehensive metabolic panel; Future  -     Lipid panel; Future  -     CBC and differential  -     Comprehensive metabolic panel  -     Lipid panel    4  Screening for thyroid disorder  -     TSH, 3rd generation with Free T4 reflex; Future  -     TSH, 3rd generation with Free T4 reflex    5  Vitamin D insufficiency  -     Vitamin D 25 hydroxy; Future  -     Vitamin D 25 hydroxy    6  Iron deficiency  -     Fe+TIBC+Jeremy; Future  -     Fe+TIBC+Jeremy    Depression Screening Follow-up Plan: Patient's depression screening was positive with a PHQ-2 score of 6  Their PHQ-9 score was 14  Patient assessed for underlying major depression  They have no active suicidal ideations  Brief counseling provided and recommend additional follow-up/re-evaluation next office visit  She has been seeing a counselor routinely, reports she has some situational stressors, but overall doing okay  We briefly discussed the use of medication and recommended she return if needed     Patient Counseling:   Nutrition: Stressed importance of a well balanced diet, moderation of sodium/saturated fat, caloric balance and sufficient intake of fiber  Exercise: Stressed the importance of regular exercise with a goal of 150 minutes per week  Dental Health: Discussed daily flossing and brushing and regular dental visits     Immunizations reviewed: Up To Date  Discussed benefits of:  Cervical Cancer screening and Screening labs  BMI Counseling: Body mass index is 31 8 kg/m²   Discussed with patient's BMI with her  The BMI is above normal  Exercise recommendations include moderate aerobic physical activity for 150 minutes/week  Return if symptoms worsen or fail to improve, for Next scheduled follow up, Annual physical         Chief Complaint     Chief Complaint   Patient presents with   • Physical Exam     wmcma       History of Present Illness     Here today for CPE  Has been experiencing vertigo for the past few weeks  Feels like the room is spinning, head feels woozy  No ear discomfort  Has some associated nausea at time  Has been taking Meclizine prn, however this makes her drowsy  Well Adult Physical   Patient here for a comprehensive physical exam       Diet and Physical Activity  Diet: well balanced diet  Exercise: infrequently      Depression Screen  PHQ-2/9 Depression Screening    Little interest or pleasure in doing things: 3 - nearly every day  Feeling down, depressed, or hopeless: 3 - nearly every day  Trouble falling or staying asleep, or sleeping too much: 2 - more than half the days  Feeling tired or having little energy: 3 - nearly every day  Poor appetite or overeatin - not at all  Feeling bad about yourself - or that you are a failure or have let yourself or your family down: 0 - not at all  Trouble concentrating on things, such as reading the newspaper or watching television: 3 - nearly every day  Moving or speaking so slowly that other people could have noticed   Or the opposite - being so fidgety or restless that you have been moving around a lot more than usual: 0 - not at all  Thoughts that you would be better off dead, or of hurting yourself in some way: 0 - not at all  PHQ-2 Score: 6  PHQ-2 Interpretation: POSITIVE depression screen  PHQ-9 Score: 14   PHQ-9 Interpretation: Moderate depression           General Health  Hearing: Normal:  bilateral  Vision: no vision problems and previous LASIK surgery  Dental: regular dental visits and brushes teeth twice daily    Reproductive Health  Regular Periods and Follows with gynecologist      The following portions of the patient's history were reviewed and updated as appropriate: allergies, current medications, past family history, past medical history, past social history, past surgical history and problem list     Review of Systems     Review of Systems   Constitutional: Negative  Respiratory: Negative  Cardiovascular: Negative for chest pain and palpitations  Chest pounding at night   Neurological: Negative  Past Medical History     History reviewed  No pertinent past medical history  Past Surgical History     Past Surgical History:   Procedure Laterality Date   •  SECTION     • EYE SURGERY      lasik   • UTERINE FIBROID SURGERY     • WISDOM TOOTH EXTRACTION         Social History     Social History     Socioeconomic History   • Marital status: /Civil Union     Spouse name: None   • Number of children: None   • Years of education: None   • Highest education level: None   Occupational History   • None   Tobacco Use   • Smoking status: Never Smoker   • Smokeless tobacco: Never Used   Vaping Use   • Vaping Use: Never used   Substance and Sexual Activity   • Alcohol use: Yes     Comment: rare    • Drug use: No   • Sexual activity: None   Other Topics Concern   • None   Social History Narrative   • None     Social Determinants of Health     Financial Resource Strain: Not on file   Food Insecurity: Not on file   Transportation Needs: Not on file   Physical Activity: Not on file   Stress: Not on file   Social Connections: Not on file   Intimate Partner Violence: Not on file   Housing Stability: Not on file       Family History     History reviewed  No pertinent family history      Current Medications       Current Outpatient Medications:   •  cyclobenzaprine (FLEXERIL) 10 mg tablet, Take 1 tablet (10 mg total) by mouth 3 (three) times a day as needed for muscle spasms, Disp: 20 tablet, Rfl: 0  •  mometasone (ELOCON) 0 1 % lotion, Apply topically, Disp: , Rfl:   •  Multiple Vitamins-Minerals (MULTIVITAMIN ADULT PO), Take 1 tablet by mouth daily, Disp: , Rfl:   •  naproxen sodium (ANAPROX) 550 mg tablet, , Disp: , Rfl:   •  Oxymetazoline HCl (AFRIN 12 HOUR NA), PRN , Disp: , Rfl:   •  Tranexamic Acid 650 MG TABS, Take 1,300 mg by mouth 3 (three) times a day Takes for 3 days during menstrual cycle, Disp: , Rfl:   •  Triamcinolone Acetonide (NASACORT ALLERGY 24HR NA), PRN , Disp: , Rfl:   •  metoclopramide (REGLAN) 5 mg tablet, Take 5 mg by mouth as needed, Disp: , Rfl:      Allergies     Allergies   Allergen Reactions   • Cat Hair Extract Itching, Nasal Congestion and Sneezing   • Pineapple - Food Allergy Abdominal Pain and GI Intolerance       Objective     /60   Pulse 105   Temp 97 7 °F (36 5 °C)   Resp 16   Ht 5' 6" (1 676 m)   Wt 89 4 kg (197 lb)   LMP 09/17/2022   SpO2 97%   BMI 31 80 kg/m²      Physical Exam  Vitals and nursing note reviewed  Constitutional:       Appearance: Normal appearance  She is well-developed  HENT:      Head: Normocephalic  Right Ear: External ear normal       Left Ear: External ear normal       Nose: Nose normal    Eyes:      Extraocular Movements: Extraocular movements intact  Right eye: No nystagmus  Left eye: No nystagmus  Conjunctiva/sclera: Conjunctivae normal       Pupils: Pupils are equal, round, and reactive to light  Neck:      Thyroid: No thyromegaly  Cardiovascular:      Rate and Rhythm: Normal rate and regular rhythm  Pulses: Normal pulses  Heart sounds: Normal heart sounds  No murmur heard  Pulmonary:      Effort: Pulmonary effort is normal       Breath sounds: Normal breath sounds  Abdominal:      General: Bowel sounds are normal  There is no distension  Palpations: Abdomen is soft  Musculoskeletal:         General: Normal range of motion  Cervical back: Neck supple     Lymphadenopathy: Cervical: No cervical adenopathy  Skin:     General: Skin is warm and dry  Neurological:      Mental Status: She is alert and oriented to person, place, and time  Sensory: No sensory deficit  Coordination: Coordination normal       Deep Tendon Reflexes: Reflexes are normal and symmetric  Reflexes normal    Psychiatric:         Mood and Affect: Mood normal          Behavior: Behavior normal             Visual Acuity Screening    Right eye Left eye Both eyes   Without correction: 20/20 20/20 20/20   With correction:              [address]   Depression Screening Follow-up Plan: Patient's depression screening was positive with a PHQ-2 score of 6  Their PHQ-9 score was 14  Patient assessed for underlying major depression  They have no active suicidal ideations  Brief counseling provided and recommend additional follow-up/re-evaluation next office visit  She has been seeing a counselor routinely, reports she has some situational stressors, but overall doing okay    We briefly discussed the use of medication and recommended she return if needed

## 2022-10-17 ENCOUNTER — OFFICE VISIT (OUTPATIENT)
Dept: URGENT CARE | Facility: CLINIC | Age: 37
End: 2022-10-17

## 2022-10-17 VITALS
SYSTOLIC BLOOD PRESSURE: 143 MMHG | TEMPERATURE: 99.8 F | DIASTOLIC BLOOD PRESSURE: 68 MMHG | HEART RATE: 81 BPM | OXYGEN SATURATION: 99 % | RESPIRATION RATE: 18 BRPM

## 2022-10-17 DIAGNOSIS — R00.2 HEART PALPITATIONS: Primary | ICD-10-CM

## 2022-10-17 NOTE — PROGRESS NOTES
[facility]        NAME: Shelby Mcclellan is a 42 y o  female  : 1985    MRN: [id number]  DATE: 2022  TIME: 4:41 PM    Assessment and Plan   Heart palpitations [R00 2]  1  Heart palpitations           Patient Instructions   Heart palpitations  EKG normal sinus rhythm but not having sensation at the time  rec f/u with PCP and finishing blood work etc that they ordered for dizziness  Racing heart could be reaction to dizziness and near syncope and is connected  Increase fluid intake  Also possible ref to cardio for Holter monitor to catch palpitations    Follow up with PCP in 3-5 days  Proceed to  ER if symptoms worsen  Chief Complaint     Chief Complaint   Patient presents with   • Nausea     Seen for f/u vertigo 10/13/2022 @pcp office , rx  Meclazine  ,only use x 1 dose, reports of it not helping,Labs ordered -reports of having an appt  Tomorrow for labs  • Dizziness   • Rapid Heart Rate     C/O intermittent episodes of heart racing x 1 week  History of Present Illness       Andree Montano is a 43-year-old female who presents to clinic complaining of dizziness and heart palpitations x1 week  She states she has intermittent episodes where she feels like her heart is racing and she gets dizzy and nauseous  When she has these episodes of dizziness increases  She denies any chest pain or shortness of breath  She denies any vomiting  Review of Systems   Review of Systems   Constitutional: Negative  Eyes: Negative for visual disturbance  Respiratory: Negative for shortness of breath  Cardiovascular: Positive for palpitations  Negative for chest pain  Gastrointestinal: Positive for nausea  Negative for vomiting  Neurological: Positive for dizziness  Negative for light-headedness           Current Medications       Current Outpatient Medications:   •  cyclobenzaprine (FLEXERIL) 10 mg tablet, Take 1 tablet (10 mg total) by mouth 3 (three) times a day as needed for muscle spasms, Disp: 20 tablet, Rfl: 0  •  metoclopramide (REGLAN) 5 mg tablet, Take 5 mg by mouth as needed, Disp: , Rfl:   •  mometasone (ELOCON) 0 1 % lotion, Apply topically, Disp: , Rfl:   •  Multiple Vitamins-Minerals (MULTIVITAMIN ADULT PO), Take 1 tablet by mouth daily, Disp: , Rfl:   •  naproxen sodium (ANAPROX) 550 mg tablet, , Disp: , Rfl:   •  Oxymetazoline HCl (AFRIN 12 HOUR NA), PRN , Disp: , Rfl:   •  Tranexamic Acid 650 MG TABS, Take 1,300 mg by mouth 3 (three) times a day Takes for 3 days during menstrual cycle, Disp: , Rfl:   •  Triamcinolone Acetonide (NASACORT ALLERGY 24HR NA), PRN , Disp: , Rfl:     Current Allergies     Allergies as of 10/17/2022 - Reviewed 10/17/2022   Allergen Reaction Noted   • Cat hair extract Itching, Nasal Congestion, and Sneezing 2021   • Pineapple - food allergy Abdominal Pain and GI Intolerance 2016            The following portions of the patient's history were reviewed and updated as appropriate: allergies, current medications, past family history, past medical history, past social history, past surgical history and problem list      History reviewed  No pertinent past medical history  Past Surgical History:   Procedure Laterality Date   •  SECTION     • EYE SURGERY      lasik   • UTERINE FIBROID SURGERY     • WISDOM TOOTH EXTRACTION         No family history on file  Medications have been verified  Objective   /68   Pulse 81   Temp 99 8 °F (37 7 °C)   Resp 18   LMP 2022   SpO2 99%   Patient's last menstrual period was 2022  Physical Exam     Physical Exam  Vitals and nursing note reviewed  Constitutional:       General: She is not in acute distress  Appearance: Normal appearance  She is not ill-appearing  Cardiovascular:      Rate and Rhythm: Normal rate and regular rhythm  Heart sounds: Normal heart sounds     Pulmonary:      Effort: Pulmonary effort is normal       Breath sounds: Normal breath sounds  Neurological:      Mental Status: She is alert and oriented to person, place, and time     Psychiatric:         Mood and Affect: Mood normal          Behavior: Behavior normal

## 2022-10-18 LAB
ATRIAL RATE: 67 BPM
P AXIS: 67 DEGREES
PR INTERVAL: 150 MS
QRS AXIS: 14 DEGREES
QRSD INTERVAL: 72 MS
QT INTERVAL: 384 MS
QTC INTERVAL: 405 MS
T WAVE AXIS: 23 DEGREES
VENTRICULAR RATE: 67 BPM

## 2022-10-19 LAB
25(OH)D3+25(OH)D2 SERPL-MCNC: 26.9 NG/ML (ref 30–100)
ALBUMIN SERPL-MCNC: 4.5 G/DL (ref 3.8–4.8)
ALBUMIN/GLOB SERPL: 2.4 {RATIO} (ref 1.2–2.2)
ALP SERPL-CCNC: 55 IU/L (ref 44–121)
ALT SERPL-CCNC: 14 IU/L (ref 0–32)
AST SERPL-CCNC: 15 IU/L (ref 0–40)
BASOPHILS # BLD AUTO: 0.1 X10E3/UL (ref 0–0.2)
BASOPHILS NFR BLD AUTO: 1 %
BILIRUB SERPL-MCNC: <0.2 MG/DL (ref 0–1.2)
BUN SERPL-MCNC: 8 MG/DL (ref 6–20)
BUN/CREAT SERPL: 9 (ref 9–23)
CALCIUM SERPL-MCNC: 9.2 MG/DL (ref 8.7–10.2)
CHLORIDE SERPL-SCNC: 104 MMOL/L (ref 96–106)
CHOLEST SERPL-MCNC: 198 MG/DL (ref 100–199)
CHOLEST/HDLC SERPL: 4.8 RATIO (ref 0–4.4)
CO2 SERPL-SCNC: 21 MMOL/L (ref 20–29)
CREAT SERPL-MCNC: 0.86 MG/DL (ref 0.57–1)
EGFR: 88 ML/MIN/1.73
EOSINOPHIL # BLD AUTO: 0.4 X10E3/UL (ref 0–0.4)
EOSINOPHIL NFR BLD AUTO: 5 %
ERYTHROCYTE [DISTWIDTH] IN BLOOD BY AUTOMATED COUNT: 11.7 % (ref 11.7–15.4)
FERRITIN SERPL-MCNC: 21 NG/ML (ref 15–150)
GLOBULIN SER-MCNC: 1.9 G/DL (ref 1.5–4.5)
GLUCOSE SERPL-MCNC: 91 MG/DL (ref 70–99)
HCT VFR BLD AUTO: 39.9 % (ref 34–46.6)
HDLC SERPL-MCNC: 41 MG/DL
HGB BLD-MCNC: 13.8 G/DL (ref 11.1–15.9)
IMM GRANULOCYTES # BLD: 0 X10E3/UL (ref 0–0.1)
IMM GRANULOCYTES NFR BLD: 0 %
IRON SATN MFR SERPL: 16 % (ref 15–55)
IRON SERPL-MCNC: 63 UG/DL (ref 27–159)
LDLC SERPL CALC-MCNC: 115 MG/DL (ref 0–99)
LYMPHOCYTES # BLD AUTO: 2.5 X10E3/UL (ref 0.7–3.1)
LYMPHOCYTES NFR BLD AUTO: 30 %
MCH RBC QN AUTO: 31.4 PG (ref 26.6–33)
MCHC RBC AUTO-ENTMCNC: 34.6 G/DL (ref 31.5–35.7)
MCV RBC AUTO: 91 FL (ref 79–97)
MICRODELETION SYND BLD/T FISH: NORMAL
MONOCYTES # BLD AUTO: 0.6 X10E3/UL (ref 0.1–0.9)
MONOCYTES NFR BLD AUTO: 7 %
NEUTROPHILS # BLD AUTO: 4.8 X10E3/UL (ref 1.4–7)
NEUTROPHILS NFR BLD AUTO: 57 %
PLATELET # BLD AUTO: 221 X10E3/UL (ref 150–450)
POTASSIUM SERPL-SCNC: 4.2 MMOL/L (ref 3.5–5.2)
PROT SERPL-MCNC: 6.4 G/DL (ref 6–8.5)
RBC # BLD AUTO: 4.4 X10E6/UL (ref 3.77–5.28)
SL AMB VLDL CHOLESTEROL CALC: 42 MG/DL (ref 5–40)
SODIUM SERPL-SCNC: 139 MMOL/L (ref 134–144)
TIBC SERPL-MCNC: 405 UG/DL (ref 250–450)
TRIGL SERPL-MCNC: 241 MG/DL (ref 0–149)
TSH SERPL DL<=0.005 MIU/L-ACNC: 4.04 UIU/ML (ref 0.45–4.5)
UIBC SERPL-MCNC: 342 UG/DL (ref 131–425)
WBC # BLD AUTO: 8.4 X10E3/UL (ref 3.4–10.8)

## 2023-06-13 ENCOUNTER — TELEPHONE (OUTPATIENT)
Dept: ADMINISTRATIVE | Facility: OTHER | Age: 38
End: 2023-06-13

## 2023-06-13 NOTE — TELEPHONE ENCOUNTER
----- Message from Mirlande Pemberton MA sent at 6/13/2023  8:50 AM EDT -----  Regarding: PAP/MAMMO  06/13/23 8:50 AM    Hello, our patient attached above has had Mammogram completed/performed  Please assist in updating the patient chart by pulling the Care Everywhere (CE) document  The date of service is 06/12/2023  Thank you,  Mirlande Pemberton  [facility]       06/13/23 8:51 AM    Hello, our patient Velvet Stein has had Pap Smear (HPV) aka Cervical Cancer Screening completed/performed  Please assist in updating the patient chart by pulling the Care Everywhere (CE) document  The date of service is 03/28/2023       Thank you,  Mirlande Pemberton MA  [facility]

## 2023-06-13 NOTE — TELEPHONE ENCOUNTER
Upon review of the In Basket request we were able to locate, review, and update the patient chart as requested for Mammogram and Pap Smear (HPV) aka Cervical Cancer Screening  Any additional questions or concerns should be emailed to the Practice Liaisons via the appropriate education email address, please do not reply via In Basket      Thank you  Oumou Kenyon

## 2023-08-08 ENCOUNTER — OFFICE VISIT (OUTPATIENT)
Dept: OTOLARYNGOLOGY | Facility: CLINIC | Age: 38
End: 2023-08-08
Payer: COMMERCIAL

## 2023-08-08 VITALS
SYSTOLIC BLOOD PRESSURE: 118 MMHG | WEIGHT: 200 LBS | HEIGHT: 66 IN | BODY MASS INDEX: 32.14 KG/M2 | DIASTOLIC BLOOD PRESSURE: 72 MMHG | TEMPERATURE: 97.3 F

## 2023-08-08 DIAGNOSIS — E55.9 VITAMIN D INSUFFICIENCY: ICD-10-CM

## 2023-08-08 DIAGNOSIS — R53.83 OTHER FATIGUE: ICD-10-CM

## 2023-08-08 DIAGNOSIS — R42 VERTIGO: Primary | ICD-10-CM

## 2023-08-08 DIAGNOSIS — E07.89 THYROID FULLNESS: ICD-10-CM

## 2023-08-08 PROCEDURE — 99204 OFFICE O/P NEW MOD 45 MIN: CPT | Performed by: NURSE PRACTITIONER

## 2023-08-08 NOTE — ASSESSMENT & PLAN NOTE
Discussed possible causes of vertigo including neurological, cardiac, autoimmune, Otitis media, sinusitis, chemical imbalance, and inner ear concerns. Based on report of symptoms high suspicion for BPPV, associated with labyrinthitis secondary to possible viral illness   Physical exam revealed negative Thana Surekha, negative Romberg, normal tandem walk. Suggest audiogram today to further evaluate bilateral ears to rule out otitis media and meniere's disease. Pt reports hearing is normal.  Reviewed prior testing. No recent imaging of head. Labs with notable vitamin D deficiency. Recommend Vitamin D3 - 2,000 units per day. Treatment options include at home epley's, nasal steroids, oral steroids, lab studies, vestibular therapy, VNG testing, neurology consultation, MRI brain with IAC. After discussion agreed to nasal steroids, Calritin daily for 4 to 6 weeks, home exercises, if no improvement in one to two weeks then consider PT/balance therapy.  Labs ordered  Consider further testing including audiogram visit if needed  Follow up in 6 weeks if needed

## 2023-08-08 NOTE — PATIENT INSTRUCTIONS
Claritin daily with Nasocort nasal spray one spray each nostril twice per day for 4 to 6 weeks    Home exercises - if no improvement in one to two weeks then proceed with balance therapy    Vitamin D3 - 2,000 units per day

## 2023-08-08 NOTE — PROGRESS NOTES
Assessment/Plan:    Vertigo  Discussed possible causes of vertigo including neurological, cardiac, autoimmune, Otitis media, sinusitis, chemical imbalance, and inner ear concerns. Based on report of symptoms high suspicion for BPPV, associated with labyrinthitis secondary to possible viral illness   Physical exam revealed negative Veto [place], negative Romberg, normal tandem walk. Suggest audiogram today to further evaluate bilateral ears to rule out otitis media and meniere's disease. Pt reports hearing is normal.  Reviewed prior testing. No recent imaging of head. Labs with notable vitamin D deficiency. Recommend Vitamin D3 - 2,000 units per day. Treatment options include at home epley's, nasal steroids, oral steroids, lab studies, vestibular therapy, VNG testing, neurology consultation, MRI brain with IAC. After discussion agreed to nasal steroids, Calritin daily for 4 to 6 weeks, home exercises, if no improvement in one to two weeks then consider PT/balance therapy. Labs ordered  Consider further testing including audiogram visit if needed  Follow up in 6 weeks if needed             Diagnoses and all orders for this visit:    Vertigo  -     TSH, 3rd generation with Free T4 reflex; Future  -     T4; Future  -     PTH, intact; Future  -     Vitamin D 25 hydroxy; Future  -     Vitamin B12/Folate, Serum Panel; Future  -     TRACY w/Reflex; Future  -     RF Screen w/ Reflex to Titer; Future  -     Comprehensive metabolic panel; Future  -     CBC and differential; Future  -     FSH and LH; Future  -     Ambulatory Referral to Physical Therapy; Future    Vitamin D insufficiency  -     TSH, 3rd generation with Free T4 reflex; Future  -     T4; Future  -     PTH, intact; Future  -     Vitamin D 25 hydroxy; Future  -     Vitamin B12/Folate, Serum Panel; Future  -     TRACY w/Reflex; Future  -     RF Screen w/ Reflex to Titer; Future  -     Comprehensive metabolic panel;  Future  -     CBC and differential; Future    Other fatigue  -     TSH, 3rd generation with Free T4 reflex; Future  -     T4; Future  -     PTH, intact; Future  -     Vitamin D 25 hydroxy; Future  -     Vitamin B12/Folate, Serum Panel; Future  -     TRACY w/Reflex; Future  -     RF Screen w/ Reflex to Titer; Future  -     Comprehensive metabolic panel; Future  -     CBC and differential; Future  -     FSH and LH; Future    Thyroid fullness  -     TSH, 3rd generation with Free T4 reflex; Future  -     T4; Future  -     PTH, intact; Future  -     Vitamin D 25 hydroxy; Future  -     Vitamin B12/Folate, Serum Panel; Future  -     TRACY w/Reflex; Future  -     RF Screen w/ Reflex to Titer; Future  -     Comprehensive metabolic panel; Future  -     CBC and differential; Future          Subjective:      Patient ID: Carter Mandujano is a 34 y.o. female. Presents today as a new patient due to ear and dizziness concerns. Occurring for about 8 days. Began suddenly with spinning. Since then persistent dizzy sensation. No Headaches. Dizziness/unsteady/jello sensation. Hearing gradually worsening. Pulsing noise in ears comes and goes. No otalgia or otorrhea. Occasional wet sensation in ears, history eczema. No history of ear surgery. No current hearing aids. No Prior testing. Current treatment with reglan and Meclzine for initial episode. Nasocort intermittently. History similar episode one year ago. The following portions of the patient's history were reviewed and updated as appropriate: allergies, current medications, past family history, past medical history, past social history, past surgical history and problem list.    Review of Systems   Constitutional: Negative. HENT: Negative for congestion, ear discharge, ear pain, hearing loss, nosebleeds, postnasal drip, rhinorrhea, sinus pressure, sinus pain, sore throat, tinnitus and voice change. Respiratory: Negative for chest tightness and shortness of breath.     Skin: Negative for color change. Neurological: Positive for dizziness. Negative for numbness and headaches. Psychiatric/Behavioral: Negative. Objective:      /72 (BP Location: Left arm, Patient Position: Sitting, Cuff Size: Adult)   Temp (!) 97.3 °F (36.3 °C) (Temporal)   Ht 5' 6" (1.676 m)   Wt 90.7 kg (200 lb)   BMI 32.28 kg/m²          Physical Exam  Constitutional:       Appearance: She is well-developed. HENT:      Head: Normocephalic. Right Ear: Hearing, tympanic membrane, ear canal and external ear normal. No decreased hearing noted. No drainage or tenderness. Tympanic membrane is not perforated or erythematous. Left Ear: Hearing, tympanic membrane, ear canal and external ear normal. No decreased hearing noted. No drainage or tenderness. Tympanic membrane is not perforated or erythematous. Nose: Nose normal. No nasal deformity or septal deviation. Mouth/Throat:      Mouth: Mucous membranes are not pale and not dry. No oral lesions. Dentition: Normal dentition. Pharynx: Uvula midline. No oropharyngeal exudate. Neck:      Trachea: No tracheal deviation. Pulmonary:      Effort: Pulmonary effort is normal. No accessory muscle usage or respiratory distress. Musculoskeletal:      Cervical back: Full passive range of motion without pain and neck supple. Lymphadenopathy:      Cervical: No cervical adenopathy. Skin:     General: Skin is warm and dry. Neurological:      Mental Status: She is alert and oriented to person, place, and time. Cranial Nerves: No cranial nerve deficit. Sensory: No sensory deficit. Psychiatric:         Behavior: Behavior is cooperative.

## 2023-08-14 ENCOUNTER — EVALUATION (OUTPATIENT)
Facility: CLINIC | Age: 38
End: 2023-08-14
Payer: COMMERCIAL

## 2023-08-14 DIAGNOSIS — R42 VERTIGO: Primary | ICD-10-CM

## 2023-08-14 PROCEDURE — 97162 PT EVAL MOD COMPLEX 30 MIN: CPT

## 2023-08-14 NOTE — PROGRESS NOTES
PT Evaluation          POC expires Auth Status Total   Visits  Start date  Expiration date PT/OT + Visit Limit? Co-Insurance   23 Not required     120 PCY PT/OT/ST Yes                                                Today's date: 2023  Patient name: Roshan Rockwell  : 1985  MRN: [id number]  Referring provider: DENIA Dawson  Dx:   Encounter Diagnosis     ICD-10-CM    1. Vertigo  R42 Ambulatory Referral to Physical Therapy    f/u if no improvement with balance center             Assessment  Assessment details: Patient is a 34 y.o. Female who presents to skilled outpatient PT with dizziness which began about 2 weeks ago with initially room spinning dizziness lasting 2 hours followed by 2 weeks of swaying dizziness. Patient did experience this about a year ago as well. Patient displays negative findings for BPPV today; she did perform home Epley maneuver at symptom onset. Further evaluation is suggestive of unilateral vestibular hypofunction which may have emerged following BPPV. Dizziness reproduction with VOR, VOR cx, head shaking nystagmus test, and head thrust test further supports vestibular hypofunction. Patient's DHI score of 52/100 suggests moderate perception of dizziness handicap with impact on her daily functioning. Patient's FGA score of 23/30 illustrates impact on her dynamic balance. Issued VOR x 1 as HEP with good patient understanding. She will benefit from skilled PT services focused on vestibular adaptation in order to reduce dizziness and promote return to PLOF. Patient verbalized understanding of POC. Please contact me if you have any questions or recommendations. Thank you for the referral and the opportunity to share in [address] [place] [address].       Cut off score   All date taken from [facility] Neuro Section or Rehab Measures    DGI:  [facility]: 4 points  [facility] Ger[facility] Older Adults: 3 Points  Falls risk cut off: <19/24    FGA:  [facility]: 4 points  Geriatrics/Community [facility]elling Older Adults: </= 22/30 fall risk  Geriatrics/Community [facility] Older Adults: </= 20/30 unexplained falls in the next 6 months  Parkinsons: </= 18/30 fall risk    mCTSIB (normed on ages 24-63, lower number is less sway or better static balance)  Eyes open firm surface (norm 0.21-0.48)  Eyes closed firm surface (norm 0.48-0.99)  Eyes open foam surface (norm 0.38-0.71)  Eyes closed foam surface (norm 0.70-2.22)    DHI:  0-39: low perception of handicap  40-69: moderate perception of handicap  : severe perception of handicap  > 60: increased risk for falls    Joint Position Error Testing (JPET):  > 4.5 degrees: abnormal joint proprioception        Impairments: Abnormal coordination, abnormal gait, abnormal muscle tone, abnormal or restricted ROM, activity intolerance, impaired balance, impaired physical strength, lacks appropriate HEP, poor posture, poor body mechanics, pain with function, safety issue, abnormal movement  Understanding of Dx/Px/POC: good  Prognosis: good      Goals    Vestibular Short Term Goals (4 weeks):  - Patient will be independent with simple HEP  - Patient will tolerate 60 seconds of oculomotor exercises with minimal increase in symptoms  - Patient will demonstrate 10% decrease in symptom severity scoring with independent use of modalities  - Patient will improve FGA score by 4 points per MDC to promote improved safety with dynamic tasks    Vestibular Long Term Goals (12 weeks):  - Patient will be independent with complex HEP  - Patient will tolerate >=2 minutes of oculomotor exercises to facilitate return to reading and computer work  - Patient will report >= 50% improvement on symptom severity scoring  - Patient will demonstrate ability to perform HT in gait without veering  - Patient will score low risk for falls with FGA test with score of 23/30 or higher per current research data  - Patient will report baseline dizziness of 1/10 or less   - Patient will report 2/10 dizziness or less with visual stimulating surround with duration of 2 minutes   - Patient will report subjective improvement to 90% or higher to promote return to [address] [place] [address] details: skilled PT focused on vestibular adaptation   Patient would benefit from: Skilled PT  Planned modality interventions: Biofeedback, Cryotherapy, TENS, Thermotherapy  Planned therapy interventions: Abdominal trunk stabilization, ADL training, balance, balance/WB training, breathing training, body mechanics training, coordination, flexibility, functional ROM exercises, gait training, HEP, manual therapy, Bland Taping, motor coordination training, neuromuscular re-education, patient education, postural training, strengthening, stretching, therapeutic activities, therapeutic exercises, work re-integration  Frequency: 1-2 times per week  Duration in weeks: 12  Plan of Care beginning date: 8/14/23  Plan of Care expiration date: 12 weeks - 11/6/23  Treatment plan discussed with: patient         Subjective Evaluation    History of Present Illness  Mechanism of injury: Patient reports with vertigo which initially occurred in August and October last year, lasted about 2 hours with room spinning dizziness and then for a few weeks she felt "off" and unsteady. Symptoms recurred about 2 weeks ago with spinning dizziness which lasted about 2 hours. Since then, she still feels "off" described as being on a boat.        Dizziness Subjective  How long does dizziness last: constant while standing/walking   How would you describe the dizziness: boat, spinning   Rolling in bed: No  Supine to/from sit: No  Recent hearing loss: No  Tinnitus: No  Aural fullness/ear pain: No  Vision changes: No  History of recent viral infections: No  History of migraines: No    Red Flag Screen  - Numbness: Yes  - Tingling: Yes  - Weakness: No  - Unilateral hearing loss: No  - Slurred speech: No  - Progressive hearing loss: No  - Tremors: No  - Poor coordination: No  - UMN signs: No  - LoC: No  - Rigidity: No  - Visual field loss: No  - Memory loss: No  - CN dysfunction: No  - Vertical nystagmus: No    Pain  None reported     Social Support  Steps to enter house: 1 AMBREEN   Stairs in house: full flight    Lives in: house  Lives with:  and son     Employment status: working,   Hand dominance: RHD    Treatments  Previous treatment: Meclizine   Current treatment: Physical therapy   Diagnostic Testing: cardiac workup last year - no significant findings       Objective     Vestibular Objective  Cervical Spine AROM:  - Flexion: WFL no pain  - Extension: WFL no pain  - R Rotation: WFL no pain  - L Rotation: WFL no pain  - R Lateral Flexion: WFL no pain  - L Lateral Flexion: WFL no pain    Integrity Testing  - mVBI: negative   - Sharp Gypsy: negative   - Alar Stability Test: negative   - Posture: WNL  - Palpation: WNL      Coordination Screen  - Dysmetria: normal   - Dysdiadochokinesia: normal     Oculomotor Screen  - Baseline Symptoms: 2/10  - Baseline Observation: seated, at rest   - Gaze Holding Nystagmus: H: Normal  - Spontaneous Nystagmus Room Light: H: Normal  - Smooth Pursuits (central): H: Normal Dizziness: 3/10  - Saccades (central): H: Normal   - Near Point Convergence (normal: < 4"/10 cm - central): H: Abnormal Observation: L eye abducts, > 6"   - VORx1: H: Abnormal Observation: retinal slipping   - VOR Cancel (central): H: Normal Dizziness: 4/10   - Head Thrust (moderate to severe hypofunction): H: Abnormal Dizziness: 3/10, Observation: positive L   - Head Shaking Test (mild hypofunction): H: Abnormal Dizziness: 4-5/10, Observation: swaying     BPPV Screen  - L Jessica-Hallpike: negative  - R Jessica-Hallpike: negative  - L Horizontal Roll: negative  - R Horizontal Roll: negative     HEP:   Access Code: [email or web address]  URL: [email or web address]  Date: 08/14/2023  Prepared by: Breanna Noel    Exercises  - Standing Gaze Stabilization with Head Rotation  - 4 x daily - 7 x weekly - 3 reps - 1 minute hold  - Standing Gaze Stabilization with Head Nod  - 4 x daily - 7 x weekly - 3 reps - 1 minute hold    Performed above 1 rep each to ensure competence     Outcome Measures Initial Eval  8/14/23        mCTSIB  - FTEO (firm)  - FTEC (firm)  - FTEO (foam)  - FTEC (foam)   30 sec  30 sec  30 sec  30 sec (+)        [facility] 23/30        [address] 52/100        JPET defer degrees                                                          Precautions: history of seizures as an infant   Past Medical History:   Diagnosis Date   • Allergic rhinitis a few years ago    sometimes sniffly or sneezy in the spring   • Dizziness August 2022    periods of persistent dizziness for several weeks at a time (Aug. 2022, Oct. 2022, Aug. 2023)   • Ear problems 2006    eczema in ears   • Nasal congestion 2014    persistent during pregnancy and occasionally reoccurring since then   • Seizures ([address]) late 1983    occurred a few times as an infant but not since

## 2023-08-18 ENCOUNTER — OFFICE VISIT (OUTPATIENT)
Dept: FAMILY MEDICINE CLINIC | Facility: CLINIC | Age: 38
End: 2023-08-18
Payer: COMMERCIAL

## 2023-08-18 VITALS
HEART RATE: 92 BPM | RESPIRATION RATE: 16 BRPM | DIASTOLIC BLOOD PRESSURE: 70 MMHG | TEMPERATURE: 96 F | SYSTOLIC BLOOD PRESSURE: 114 MMHG | HEIGHT: 66 IN | WEIGHT: 206 LBS | BODY MASS INDEX: 33.11 KG/M2

## 2023-08-18 DIAGNOSIS — R79.89 ELEVATED LFTS: Primary | ICD-10-CM

## 2023-08-18 PROCEDURE — 99213 OFFICE O/P EST LOW 20 MIN: CPT | Performed by: NURSE PRACTITIONER

## 2023-08-18 RX ORDER — MECLIZINE HYDROCHLORIDE 25 MG/1
TABLET ORAL
COMMUNITY
Start: 2023-08-01

## 2023-08-18 RX ORDER — LORATADINE 10 MG/1
CAPSULE, LIQUID FILLED ORAL DAILY
COMMUNITY
Start: 2023-08-07

## 2023-08-18 NOTE — PROGRESS NOTES
Assessment/Plan:    Unclear etiology- viral vs RIZVI  Will check labs as below as well as abdominal US. F/u with results. 1. Elevated LFTs  -     Hepatic function panel; Future  -     Hepatitis panel, acute; Future  -     EBV acute panel; Future  -     US abdomen limited; Future; Expected date: 08/18/2023  -     Hepatic function panel  -     Hepatitis panel, acute  -     EBV acute panel            There are no Patient Instructions on file for this visit. Return if symptoms worsen or fail to improve. Subjective:      Patient ID: Tree Puente is a 34 y.o. female. Chief Complaint   Patient presents with   • Results     Review labs Re ARTHUR       Here today to follow up on labs. She has been seeing ENT for dizziness and lab workup demonstrated elevation in LFTs. Aside from dizziness, she is otherwise feeling well. No new medications/supplements or dietary changes. She does not drink ETOH. No recent illnesses of any kind. She has some chronic, intermittent abdominal discomfort, diarrhea, but nothing out of her norm. No changes in urination/color. The following portions of the patient's history were reviewed and updated as appropriate: allergies, current medications, past family history, past medical history, past social history, past surgical history and problem list.    Review of Systems   Constitutional: Negative for chills, fatigue and fever. HENT: Negative for sore throat. Respiratory: Negative. Cardiovascular: Negative. Gastrointestinal: Negative for abdominal pain, blood in stool, constipation, diarrhea, nausea and vomiting. Genitourinary: Negative. Skin: Negative for color change and rash. Neurological: Negative for dizziness, light-headedness and headaches.          Current Outpatient Medications   Medication Sig Dispense Refill   • Loratadine (Claritin) 10 MG CAPS in the morning     • meclizine (ANTIVERT) 25 mg tablet PRN     • metoclopramide (REGLAN) 5 mg tablet Take 5 mg by mouth as needed     • mometasone (ELOCON) 0.1 % lotion Apply topically     • Multiple Vitamins-Minerals (MULTIVITAMIN ADULT PO) Take 1 tablet by mouth daily     • naproxen sodium (ANAPROX) 550 mg tablet PRN     • Oxymetazoline HCl (AFRIN 12 HOUR NA) PRN      • Tranexamic Acid 650 MG TABS Take 1,300 mg by mouth 3 (three) times a day Takes for 3 days during menstrual cycle     • Triamcinolone Acetonide (NASACORT ALLERGY 24HR NA) PRN        No current facility-administered medications for this visit. Objective:    /70   Pulse 92   Temp (!) 96 °F (35.6 °C)   Resp 16   Ht 5' 6" (1.676 m)   Wt 93.4 kg (206 lb)   LMP  (Within Months) Comment: 1 month ago  BMI 33.25 kg/m²        Physical Exam  Vitals and nursing note reviewed. Constitutional:       Appearance: She is well-developed. Eyes:      General: No scleral icterus. Cardiovascular:      Rate and Rhythm: Normal rate and regular rhythm. Heart sounds: Normal heart sounds. No murmur heard. Pulmonary:      Effort: Pulmonary effort is normal.      Breath sounds: Normal breath sounds. Abdominal:      General: There is no distension. Palpations: There is no hepatomegaly or splenomegaly. Tenderness: There is no abdominal tenderness. Skin:     General: Skin is warm and dry. Coloration: Skin is not jaundiced. Neurological:      Mental Status: She is alert.    Psychiatric:         Mood and Affect: Mood normal.         Behavior: Behavior normal.                VICTOR HUGO John

## 2023-08-21 ENCOUNTER — OFFICE VISIT (OUTPATIENT)
Facility: CLINIC | Age: 38
End: 2023-08-21
Payer: COMMERCIAL

## 2023-08-21 DIAGNOSIS — R42 VERTIGO: Primary | ICD-10-CM

## 2023-08-21 PROCEDURE — 97112 NEUROMUSCULAR REEDUCATION: CPT

## 2023-08-21 NOTE — PROGRESS NOTES
Daily Note       POC expires Auth Status Total   Visits  Start date  Expiration date PT/OT + Visit Limit? Co-Insurance   23 Not required     120 PCY PT/OT/ST Yes                                              Today's date: 2023  Patient name: Jean Sun  : 1985  MRN: [id number]  Referring provider: DENIA Warenr  Dx:   Encounter Diagnosis     ICD-10-CM    1. Vertigo  R42                      Subjective: Patient reports she is seeing improvements with her dizziness. She experienced slight dizziness yesterday, and no dizziness so far this morning. 0/10 dizziness start of session. Objective: See treatment diary below    NMR:   - VOR x 1, 60 seconds, feet apart firm, plain background:    H: 2/10 dizziness    V: 2/10 dizziness   - VOR x 1, 90 seconds, feet apart firm, plain background:    H: 2/10 dizziness    V: 2/10 dizziness   - VOR x 1, 90 seconds, feet apart firm, busy background (2 sets):    H: 3/10 dizziness    V: 310 dizziness   - VOR cx, playing card, 30 reps each H/V/CW/CCW  -  squigz from floor, turn to place on wall - alternating sides - full set of squigz x 2  - Gait with head turns/nods: 10 x 30 feet each     Assessment: Patient tolerated session well today with progression of VOR-based exercises. Able to progress VOR x 1 to busy background with slight increase in dizziness symptoms. Recreated activities w/ repeated, quick head turns which typically increase patient's symptoms. Provided busy background for HEP with instruction to progress to 90 sec for VOR x 1; patient verbalized understanding. She will continue to benefit from skilled PT services to promote reduction of dizziness and return to PLOF. Plan: Continue per plan of care. Progress treatment as tolerated.          Outcome Measures Initial Eval  23        mCTSIB  - FTEO (firm)  - FTEC (firm)  - FTEO (foam)  - FTEC (foam)   30 sec  30 sec  30 sec  30 sec (+)        FGA         DHI 52/100 NEO defer degrees                                                          Precautions: history of seizures as an infant   Past Medical History:   Diagnosis Date   • Allergic rhinitis a few years ago    sometimes sniffly or sneezy in the spring   • Dizziness August 2022    periods of persistent dizziness for several weeks at a time (Aug. 2022, Oct. 2022, Aug. 2023)   • Ear problems 2006    eczema in ears   • Nasal congestion 2014    persistent during pregnancy and occasionally reoccurring since then   • Seizures ([address]) late 1985    occurred a few times as an infant but not since

## 2023-08-22 ENCOUNTER — HOSPITAL ENCOUNTER (OUTPATIENT)
Dept: RADIOLOGY | Facility: HOSPITAL | Age: 38
Discharge: HOME | End: 2023-08-22
Payer: COMMERCIAL

## 2023-08-22 DIAGNOSIS — R79.89 ELEVATED LFTS: ICD-10-CM

## 2023-08-22 LAB
ALBUMIN SERPL-MCNC: 4.7 G/DL (ref 3.9–4.9)
ALP SERPL-CCNC: 69 IU/L (ref 44–121)
ALT SERPL-CCNC: 100 IU/L (ref 0–32)
AST SERPL-CCNC: 90 IU/L (ref 0–40)
BILIRUB DIRECT SERPL-MCNC: <0.1 MG/DL (ref 0–0.4)
BILIRUB SERPL-MCNC: 0.3 MG/DL (ref 0–1.2)
EBV NA IGG SER IA-ACNC: 313 U/ML (ref 0–17.9)
EBV VCA IGG SER IA-ACNC: 178 U/ML (ref 0–17.9)
EBV VCA IGM SER IA-ACNC: <36 U/ML (ref 0–35.9)
INTERPRETATION: ABNORMAL
PROT SERPL-MCNC: 7 G/DL (ref 6–8.5)

## 2023-08-22 PROCEDURE — 76705 ECHO EXAM OF ABDOMEN: CPT

## 2023-08-28 ENCOUNTER — APPOINTMENT (OUTPATIENT)
Facility: CLINIC | Age: 38
End: 2023-08-28
Payer: COMMERCIAL

## 2023-08-30 DIAGNOSIS — R79.89 ELEVATED LFTS: Primary | ICD-10-CM

## 2023-08-30 DIAGNOSIS — R16.0 HEPATOMEGALY: ICD-10-CM

## 2023-09-01 ENCOUNTER — OFFICE VISIT (OUTPATIENT)
Dept: GASTROENTEROLOGY | Facility: AMBULARY SURGERY CENTER | Age: 38
End: 2023-09-01
Payer: COMMERCIAL

## 2023-09-01 VITALS
DIASTOLIC BLOOD PRESSURE: 80 MMHG | HEART RATE: 101 BPM | BODY MASS INDEX: 33.43 KG/M2 | OXYGEN SATURATION: 98 % | WEIGHT: 208 LBS | HEIGHT: 66 IN | SYSTOLIC BLOOD PRESSURE: 134 MMHG

## 2023-09-01 DIAGNOSIS — R74.8 ELEVATED LIVER ENZYMES: ICD-10-CM

## 2023-09-01 DIAGNOSIS — K76.0 NAFLD (NONALCOHOLIC FATTY LIVER DISEASE): Primary | ICD-10-CM

## 2023-09-01 DIAGNOSIS — R16.0 HEPATOMEGALY: ICD-10-CM

## 2023-09-01 DIAGNOSIS — R93.2 ABNORMAL ULTRASOUND OF LIVER: ICD-10-CM

## 2023-09-01 PROCEDURE — 99203 OFFICE O/P NEW LOW 30 MIN: CPT | Performed by: FAMILY MEDICINE

## 2023-09-01 NOTE — PROGRESS NOTES
[facility] - Outpatient Consultation  Stephie Morocho 34 y.o. female MRN: [id number]  Encounter: [id number]          ASSESSMENT AND PLAN:      1. NAFLD (nonalcoholic fatty liver disease)  2. Elevated liver enzymes  3. Hepatomegaly  4. Abnormal ultrasound of liver  Patient with new mild-moderately elevated liver enzymes prompting an U/S notable for hepatomegaly, hepatic steatosis and questionable subtle hepatic nodularity c/f advanced fibrosis. Prior to this hepatic function was WNL. EBV panel reflecting a past infection. No clinical or serologic evidence of chronic liver disease. Discussed that her new elevations may be secondary to NAFLD although, while she does have an elevated BMI, she does not have additional metabolic risk factors for the development of NAFLD. Fortunately, her repeat hepatic function panel shows slight improvement in her liver enzymes and for this reason will proceed with a focused serologic evaluation. Ordered basic serologies in addition to serologies screening for viral hepatitis, evaluate for A1AT Pi* carrier status and assess patient's immunity status to hep A and B. Patient will also complete an U/S elastography to help assess for advanced fibrosis particularly given ?hepatic nodularity on U/S. Discussed the pathophysiology of fatty liver disease and the potential to progress to cirrhosis over time if left untreated. Also discussed recommendations in regards to the treatment of fatty liver, particularly including weight loss of approx 10-15% of patient's current body weight over a 6-12 month period. Patient declined referral to weight management. If her liver enzymes remain elevated despite weight loss, are up-trending or she is noted to have advanced fibrosis on elastography would recommend completing her serologic evaluation to r/o competing causes of liver disease such as AIH, Corey's disease and hemochromatosis.     NAFLD Fibrosis Score is: -2.747    - Ambulatory Referral to Gastroenterology  - CBC and Platelet; Future  - Comprehensive metabolic panel; Future  - Protime-INR; Future  - Chronic Hepatitis Panel; Future  - Hepatitis A antibody, total; Future  - Hepatitis B surface antibody; Future  - Alpha 1 Antitrypsin Phenotype; Future  - US elastography/UGAP; Future    Follow-up in 3 months pending results of U/S elastography.     ______________________________________________________________________    HPI: Patient is a 34 y.o. female with PMH significant for obesity, eczema, right patellofemoral syndrome, vitamin D deficiency and vertigo who presents today for a consultation regarding elevated liver enzymes and an abnormal ultrasound of the liver. Patient was evaluated by ENT for vertigo and incidentally found to have elevated liver enzymes with an AST 88 and . Prior to this her hepatic function has been within normal limits. This prompted an abdominal ultrasound notable for hepatomegaly (21 cm) and hepatic steatosis in addition to questionable subtle hepatic nodularity concerning for advanced fibrosis. She was ordered for an acute hepatitis panel but this was not drawn. EBV panel reflecting a past infection. Serologies without thrombocytopenia or hypoalbuminemia. Denies a history of liver disease. Denies a family history of liver disease or liver cancer. Denies known autoimmune conditions. Denies a known past or current infection with viral hepatitis. Denies taking any additional medications, herbal supplements or performance-enhancing drugs not reflected on her med list. States she does take an excess of naproxen during her menses which was not around the time she had her labs drawn. Denies excessive Tylenol use. Denies alcohol use. Denies marijuana or kratom use. Denies any recent illnesses or antibiotic use. Denies any liver specific complaints today.        REVIEW OF SYSTEMS:    CONSTITUTIONAL: Denies any fever, chills, rigors, and weight loss. HEENT: No earache or tinnitus. Denies hearing loss or visual disturbances. CARDIOVASCULAR: No chest pain or palpitations. RESPIRATORY: Denies any cough, hemoptysis, shortness of breath or dyspnea on exertion. GASTROINTESTINAL: As noted in the History of Present Illness. GENITOURINARY: No problems with urination. Denies any hematuria or dysuria. NEUROLOGIC: No dizziness or vertigo, denies headaches. MUSCULOSKELETAL: Denies any muscle or joint pain. SKIN: Denies skin rashes or itching. ENDOCRINE: Denies excessive thirst. Denies intolerance to heat or cold. PSYCHOSOCIAL: Denies depression or anxiety. Denies any recent memory loss. Historical Information   Past Medical History:   Diagnosis Date   • Allergic rhinitis a few years ago    sometimes sniffly or sneezy in the spring   • Dizziness 2022    periods of persistent dizziness for several weeks at a time (Aug. 2022, Oct. 2022, Aug. 2023)   • Ear problems     eczema in ears   • Nasal congestion     persistent during pregnancy and occasionally reoccurring since then   • Seizures ([address]) late     occurred a few times as an infant but not since     Past Surgical History:   Procedure Laterality Date   •  SECTION     • EYE SURGERY      lasik   • UTERINE FIBROID SURGERY     • WISDOM TOOTH EXTRACTION       Social History   Social History     Substance and Sexual Activity   Alcohol Use Not Currently    Comment: rare      Social History     Substance and Sexual Activity   Drug Use Never     Social History     Tobacco Use   Smoking Status Never   Smokeless Tobacco Never     History reviewed. No pertinent family history.     Meds/Allergies       Current Outpatient Medications:   •  meclizine (ANTIVERT) 25 mg tablet  •  metoclopramide (REGLAN) 5 mg tablet  •  mometasone (ELOCON) 0.1 % lotion  •  Multiple Vitamins-Minerals (MULTIVITAMIN ADULT PO)  •  naproxen sodium (ANAPROX) 550 mg tablet  •  Oxymetazoline HCl (AFRIN 12 HOUR NA)  •  Triamcinolone Acetonide (NASACORT ALLERGY 24HR NA)  •  Loratadine (Claritin) 10 MG CAPS  •  Tranexamic Acid 650 MG TABS    Allergies   Allergen Reactions   • Cat Hair Extract Itching, Nasal Congestion and Sneezing   • Pineapple - Food Allergy Abdominal Pain and GI Intolerance           Objective     Blood pressure 134/80, pulse 101, height 5' 6" (1.676 m), weight 94.3 kg (208 lb), SpO2 98 %, not currently breastfeeding. Body mass index is 33.57 kg/m². PHYSICAL EXAM:      General Appearance:   Alert, cooperative, no distress   HEENT:   Normocephalic, atraumatic, anicteric. Neck:  Supple, symmetrical, trachea midline   Lungs:   Clear to auscultation bilaterally; no rales, rhonchi or wheezing; respirations unlabored    Heart[de-identified]   Regular rate and rhythm; no murmur, rub, or gallop. Abdomen:   Soft, non-tender, non-distended; normal bowel sounds; no masses, no organomegaly    Genitalia:   Deferred    Rectal:   Deferred    Extremities:  No cyanosis, clubbing or edema    Pulses:  2+ and symmetric    Skin:  No jaundice, rashes, or lesions    Lymph nodes:  No palpable cervical lymphadenopathy        Lab Results:   No visits with results within 1 Day(s) from this visit. Latest known visit with results is:   Office Visit on 08/18/2023   Component Date Value   • Protein, Total 08/21/2023 7.0    • Albumin 08/21/2023 4.7    • TOTAL BILIRUBIN 08/21/2023 0.3    • Bilirubin, Direct 08/21/2023 <0.10    • Alk Phos Isoenzymes 08/21/2023 69    • AST 08/21/2023 90 (H)    • ALT 08/21/2023 100 (H)    • EBV VCA IgM 08/21/2023 <36.0    • EBV VCA IgG 08/21/2023 178.0 (H)    • EBV Nuclear Ag Ab 08/21/2023 313.0 (H)    • INTERPRETATION 08/21/2023 Comment          Radiology Results:   US abdomen limited    Result Date: 8/30/2023  Narrative: RIGHT UPPER QUADRANT ULTRASOUND INDICATION:    R79.89: Other specified abnormal findings of blood chemistry. COMPARISON:  None.  TECHNIQUE:   Real-time ultrasound of the right upper quadrant was performed with a curvilinear transducer with both volumetric sweeps and still imaging techniques. FINDINGS: PANCREAS: Portions of the pancreas are obscured by bowel gas. Visualized portions of the pancreas are unremarkable. AORTA AND IVC:  Visualized portions are normal for patient age. LIVER: Size: Enlarged. The liver measures 21.0 cm in the midclavicular line. Contour: Questionable subtle nodularity to the hepatic contour. Parenchyma: There is diffuse increased echogenicity involving the liver consistent with hepatic steatosis. Limited imaging of the main portal vein shows it to be patent and hepatopetal. No definite focal hepatic mass is identified. Geographic region of decreased hepatic echogenicity adjacent to the gallbladder fossa most consistent with focal fatty sparing. BILIARY: No gallbladder findings. No intrahepatic biliary dilatation. CBD measures 4.0 mm. No choledocholithiasis. KIDNEY: Right kidney measures 10.4 SAG x 3.8 AP x 4.9 TRV cm. Volume 101.1 mL Kidney within normal limits. ASCITES:   None. Impression: Hepatomegaly with hepatic steatosis. Questionable subtle nodularity to the hepatic contour; subtle developing hepatic cirrhosis cannot be excluded. Workstation performed: [id number]       Linda Edouard PA-C     **Please note: Dictation voice to text software may have been used in the creation of this record. Occasional wrong word or “sound alike” substitutions may have occurred due to the inherent limitations of voice recognition software. Read the chart carefully and recognize, using context, where substitutions have occurred. **

## 2023-09-11 ENCOUNTER — HOSPITAL ENCOUNTER (OUTPATIENT)
Dept: RADIOLOGY | Facility: HOSPITAL | Age: 38
Discharge: HOME | End: 2023-09-11
Payer: COMMERCIAL

## 2023-09-11 DIAGNOSIS — R93.2 ABNORMAL ULTRASOUND OF LIVER: ICD-10-CM

## 2023-09-11 DIAGNOSIS — K76.0 NAFLD (NONALCOHOLIC FATTY LIVER DISEASE): ICD-10-CM

## 2023-09-11 DIAGNOSIS — R74.8 ELEVATED LIVER ENZYMES: ICD-10-CM

## 2023-09-11 PROCEDURE — 76981 USE PARENCHYMA: CPT

## 2023-09-15 LAB
A1AT PHENOTYP SERPL IFE: NORMAL
A1AT SERPL-MCNC: 129 MG/DL (ref 100–188)
ALBUMIN SERPL-MCNC: 4.8 G/DL (ref 3.9–4.9)
ALBUMIN/GLOB SERPL: 2.2 {RATIO} (ref 1.2–2.2)
ALP SERPL-CCNC: 69 IU/L (ref 44–121)
ALT SERPL-CCNC: 108 IU/L (ref 0–32)
AST SERPL-CCNC: 65 IU/L (ref 0–40)
BASOPHILS # BLD AUTO: 0.1 X10E3/UL (ref 0–0.2)
BASOPHILS NFR BLD AUTO: 1 %
BILIRUB SERPL-MCNC: 0.3 MG/DL (ref 0–1.2)
BUN SERPL-MCNC: 10 MG/DL (ref 6–24)
BUN/CREAT SERPL: 14 (ref 9–23)
CALCIUM SERPL-MCNC: 9.5 MG/DL (ref 8.7–10.2)
CHLORIDE SERPL-SCNC: 102 MMOL/L (ref 96–106)
CO2 SERPL-SCNC: 23 MMOL/L (ref 20–29)
CREAT SERPL-MCNC: 0.74 MG/DL (ref 0.57–1)
EGFR: 105 ML/MIN/1.73
EOSINOPHIL # BLD AUTO: 0.2 X10E3/UL (ref 0–0.4)
EOSINOPHIL NFR BLD AUTO: 3 %
ERYTHROCYTE [DISTWIDTH] IN BLOOD BY AUTOMATED COUNT: 12.4 % (ref 11.7–15.4)
GLOBULIN SER-MCNC: 2.2 G/DL (ref 1.5–4.5)
GLUCOSE SERPL-MCNC: 96 MG/DL (ref 70–99)
HAV AB SER QL IA: NEGATIVE
HBV CORE AB SERPL QL IA: NEGATIVE
HBV SURFACE AB SER QL: REACTIVE
HBV SURFACE AG SERPL QL IA: NEGATIVE
HCT VFR BLD AUTO: 41.7 % (ref 34–46.6)
HCV AB S/CO SERPL IA: NON REACTIVE
HGB BLD-MCNC: 14.4 G/DL (ref 11.1–15.9)
IMM GRANULOCYTES # BLD: 0 X10E3/UL (ref 0–0.1)
IMM GRANULOCYTES NFR BLD: 0 %
INR PPP: 1 (ref 0.9–1.2)
INTERPRETATION: NORMAL
LYMPHOCYTES # BLD AUTO: 1.6 X10E3/UL (ref 0.7–3.1)
LYMPHOCYTES NFR BLD AUTO: 27 %
MCH RBC QN AUTO: 31.1 PG (ref 26.6–33)
MCHC RBC AUTO-ENTMCNC: 34.5 G/DL (ref 31.5–35.7)
MCV RBC AUTO: 90 FL (ref 79–97)
MONOCYTES # BLD AUTO: 0.5 X10E3/UL (ref 0.1–0.9)
MONOCYTES NFR BLD AUTO: 8 %
NEUTROPHILS # BLD AUTO: 3.6 X10E3/UL (ref 1.4–7)
NEUTROPHILS NFR BLD AUTO: 61 %
PLATELET # BLD AUTO: 253 X10E3/UL (ref 150–450)
POTASSIUM SERPL-SCNC: 4.1 MMOL/L (ref 3.5–5.2)
PROT SERPL-MCNC: 7 G/DL (ref 6–8.5)
PROTHROMBIN TIME: 10.3 SEC (ref 9.1–12)
RBC # BLD AUTO: 4.63 X10E6/UL (ref 3.77–5.28)
RFX TO HBC IGM: NORMAL
SL AMB INTERPRETATION: NORMAL
SODIUM SERPL-SCNC: 139 MMOL/L (ref 134–144)
WBC # BLD AUTO: 5.8 X10E3/UL (ref 3.4–10.8)

## 2023-09-27 DIAGNOSIS — R74.8 ELEVATED LIVER ENZYMES: Primary | ICD-10-CM

## 2023-09-27 DIAGNOSIS — R16.0 HEPATOMEGALY: ICD-10-CM

## 2023-11-02 ENCOUNTER — APPOINTMENT (OUTPATIENT)
Dept: LAB | Facility: HOSPITAL | Age: 38
End: 2023-11-02
Payer: COMMERCIAL

## 2023-11-02 DIAGNOSIS — R74.8 ELEVATED LIVER ENZYMES: ICD-10-CM

## 2023-11-02 DIAGNOSIS — R16.0 HEPATOMEGALY: ICD-10-CM

## 2023-11-02 LAB
ALBUMIN SERPL BCP-MCNC: 4.3 G/DL (ref 3.5–5)
ALP SERPL-CCNC: 56 U/L (ref 34–104)
ALT SERPL W P-5'-P-CCNC: 73 U/L (ref 7–52)
ANA SER QL IA: NEGATIVE
ANION GAP SERPL CALCULATED.3IONS-SCNC: 9 MMOL/L
AST SERPL W P-5'-P-CCNC: 49 U/L (ref 13–39)
BILIRUB SERPL-MCNC: 0.43 MG/DL (ref 0.2–1)
BUN SERPL-MCNC: 8 MG/DL (ref 5–25)
CALCIUM SERPL-MCNC: 8.9 MG/DL (ref 8.4–10.2)
CHLORIDE SERPL-SCNC: 103 MMOL/L (ref 96–108)
CO2 SERPL-SCNC: 26 MMOL/L (ref 21–32)
CREAT SERPL-MCNC: 0.77 MG/DL (ref 0.6–1.3)
FERRITIN SERPL-MCNC: 15 NG/ML (ref 11–307)
GFR SERPL CREATININE-BSD FRML MDRD: 96 ML/MIN/1.73SQ M
GLUCOSE P FAST SERPL-MCNC: 84 MG/DL (ref 65–99)
IGA SERPL-MCNC: 132 MG/DL (ref 66–433)
IGG SERPL-MCNC: 699 MG/DL (ref 635–1741)
IGM SERPL-MCNC: 115 MG/DL (ref 45–281)
POTASSIUM SERPL-SCNC: 3.6 MMOL/L (ref 3.5–5.3)
PROT SERPL-MCNC: 6.9 G/DL (ref 6.4–8.4)
SODIUM SERPL-SCNC: 138 MMOL/L (ref 135–147)

## 2023-11-02 PROCEDURE — 86381 MITOCHONDRIAL ANTIBODY EACH: CPT

## 2023-11-02 PROCEDURE — 80053 COMPREHEN METABOLIC PANEL: CPT

## 2023-11-02 PROCEDURE — 82728 ASSAY OF FERRITIN: CPT

## 2023-11-02 PROCEDURE — 82784 ASSAY IGA/IGD/IGG/IGM EACH: CPT

## 2023-11-02 PROCEDURE — 86015 ACTIN ANTIBODY EACH: CPT

## 2023-11-02 PROCEDURE — 36415 COLL VENOUS BLD VENIPUNCTURE: CPT

## 2023-11-02 PROCEDURE — 82390 ASSAY OF CERULOPLASMIN: CPT

## 2023-11-02 PROCEDURE — 86038 ANTINUCLEAR ANTIBODIES: CPT

## 2023-11-03 LAB
ACTIN IGG SERPL-ACNC: 3 UNITS (ref 0–19)
CERULOPLASMIN SERPL-MCNC: 27.8 MG/DL (ref 19–39)
MITOCHONDRIA M2 IGG SER-ACNC: <20 UNITS (ref 0–20)

## 2023-11-09 ENCOUNTER — TELEPHONE (OUTPATIENT)
Age: 38
End: 2023-11-09

## 2023-11-09 ENCOUNTER — NURSE TRIAGE (OUTPATIENT)
Age: 38
End: 2023-11-09

## 2023-11-09 NOTE — TELEPHONE ENCOUNTER
Reason for Disposition   Information only question and nurse able to answer    Answer Assessment - Initial Assessment Questions  1. REASON FOR CALL or QUESTION: Patient returning [address] phone call. Aware of josef's message. Patient state that she has been told in the past that she had low iron. She also just had her menstrual cycle. She will follow up with her PCP for that. No signs of GI bleeding. Protocols used:  Information Only Call - No Triage-ADULT-[place]

## 2024-03-22 DIAGNOSIS — Z00.6 ENCOUNTER FOR EXAMINATION FOR NORMAL COMPARISON OR CONTROL IN CLINICAL RESEARCH PROGRAM: ICD-10-CM

## 2024-03-23 ENCOUNTER — APPOINTMENT (OUTPATIENT)
Dept: LAB | Facility: HOSPITAL | Age: 39
End: 2024-03-23
Attending: PATHOLOGY

## 2024-03-23 DIAGNOSIS — Z00.6 ENCOUNTER FOR EXAMINATION FOR NORMAL COMPARISON OR CONTROL IN CLINICAL RESEARCH PROGRAM: ICD-10-CM

## 2024-03-23 PROCEDURE — 36415 COLL VENOUS BLD VENIPUNCTURE: CPT

## 2024-05-23 ENCOUNTER — OFFICE VISIT (OUTPATIENT)
Dept: OBGYN CLINIC | Facility: CLINIC | Age: 39
End: 2024-05-23
Payer: COMMERCIAL

## 2024-05-23 VITALS
DIASTOLIC BLOOD PRESSURE: 74 MMHG | SYSTOLIC BLOOD PRESSURE: 114 MMHG | BODY MASS INDEX: 34.23 KG/M2 | HEIGHT: 66 IN | WEIGHT: 213 LBS

## 2024-05-23 DIAGNOSIS — Z12.31 ENCOUNTER FOR SCREENING MAMMOGRAM FOR MALIGNANT NEOPLASM OF BREAST: ICD-10-CM

## 2024-05-23 DIAGNOSIS — Z01.419 ENCOUNTER FOR GYNECOLOGICAL EXAMINATION WITHOUT ABNORMAL FINDING: Primary | ICD-10-CM

## 2024-05-23 DIAGNOSIS — N92.0 MENORRHAGIA WITH REGULAR CYCLE: ICD-10-CM

## 2024-05-23 PROCEDURE — 99386 PREV VISIT NEW AGE 40-64: CPT | Performed by: OBSTETRICS & GYNECOLOGY

## 2024-05-23 RX ORDER — TRANEXAMIC ACID 650 MG/1
1300 TABLET ORAL 3 TIMES DAILY
Qty: 30 TABLET | Refills: 11 | Status: SHIPPED | OUTPATIENT
Start: 2024-05-23 | End: 2024-05-28

## 2024-05-23 NOTE — PROGRESS NOTES
Lorrie Arreola  1985      CC:  Yearly exam    S:  39 y.o. female here for yearly exam. She is new to our network, having previously seen [facility].    Her cycles are regular and heavy with significant cramping.  She has been told she has PCOS and was discussing work-up for heavy menses with last gynecologist.  Discussed ultrasound and EB.  She has been using Lysteda with good success.  Will continue.     Patient prefers masking of all providers and self at this time secondary to immunocompromised family members.  She has also heard that RIZVI is an increased risk for complications if she acquires COVID.      Sexual activity: She is sexually active without pain, bleeding or dryness.     Contraception: She uses natural family planning and abstinence ('s medical problems) for contraception. She is okay with pregnancy if it occurs.      Last Pap 3/28/2023 - normal/negative HPV  Last Mammo 6/12/2023 - BIRAD-2    We reviewed ASCCP guidelines for Pap testing today.       Current Outpatient Medications:     Cholecalciferol (Vitamin D3) 125 MCG (5000 UT) CHEW, , Disp: , Rfl:     FERROUS BISGLYCINATE CHELATE PO, , Disp: , Rfl:     mometasone (ELOCON) 0.1 % lotion, Apply topically, Disp: , Rfl:     Multiple Vitamins-Minerals (MULTIVITAMIN ADULT PO), Take 1 tablet by mouth daily, Disp: , Rfl:     naproxen sodium (ANAPROX) 550 mg tablet, PRN, Disp: , Rfl:     Tranexamic Acid 650 MG TABS, Take 1,300 mg by mouth 3 (three) times a day Takes for 3 days during menstrual cycle, Disp: , Rfl:     Triamcinolone Acetonide (NASACORT ALLERGY 24HR NA), PRN , Disp: , Rfl:     meclizine (ANTIVERT) 25 mg tablet, PRN (Patient not taking: Reported on 5/23/2024), Disp: , Rfl:     metoclopramide (REGLAN) 5 mg tablet, Take 5 mg by mouth as needed (Patient not taking: Reported on 5/23/2024), Disp: , Rfl:   Social History     Socioeconomic History    Marital status: /Civil Union     Spouse name: Not on file    Number of children: Not on file  "   Years of education: Not on file    Highest education level: Not on file   Occupational History    Not on file   Tobacco Use    Smoking status: Never    Smokeless tobacco: Never   Vaping Use    Vaping status: Never Used   Substance and Sexual Activity    Alcohol use: Yes     Comment: a little wine every 1 - 2 months, on average    Drug use: Never    Sexual activity: Yes     Partners: Male     Birth control/protection: Abstinence   Other Topics Concern    Not on file   Social History Narrative    Not on file     Social Determinants of Health     Financial Resource Strain: Not on file   Food Insecurity: Not on file   Transportation Needs: Not on file   Physical Activity: Not on file   Stress: Not on file   Social Connections: Not on file   Intimate Partner Violence: Not on file   Housing Stability: Not on file     Family History   Problem Relation Age of Onset    Miscarriages / Stillbirths Mother         2 miscarriages    Miscarriages / Stillbirths Sister         1 miscarriage      Past Medical History:   Diagnosis Date    Allergic rhinitis a few years ago    sometimes sniffly or sneezy in the spring    Anemia 2000    heavy periods    Dizziness August 2022    periods of persistent dizziness for several weeks at a time (Aug. 2022, Oct. 2022, Aug. 2023)    Ear problems 2006    eczema in ears    Liver disease August 2023    NAFLD    Miscarriage 2012    Nasal congestion 2014    persistent during pregnancy and occasionally reoccurring since then    Polycystic ovary syndrome 2012    Seizures ([facility]) late 1983    occurred a few times as an infant but not since        Review of Systems   Respiratory: Negative.    Cardiovascular: Negative.    Gastrointestinal: Negative for constipation and diarrhea.   Genitourinary: Negative for difficulty urinating, pelvic pain, vaginal bleeding, vaginal discharge, itching or odor.    O:  Blood pressure 114/74, height 5' 6\" (1.676 m), weight 96.6 kg (213 lb), last menstrual period 05/14/2024, " not currently breastfeeding.    Patient appears well and is not in distress  Neck is supple without masses  Breasts are symmetrical without mass, tenderness, nipple discharge, skin changes or adenopathy.   Abdomen is soft and nontender without masses.   External genitals are normal without lesions or rashes.  Urethral meatus and urethra are normal  Bladder is normal to palpation  Vagina is normal without discharge or bleeding.   Cervix is normal without discharge or lesion.   Uterus is normal, mobile, nontender without palpable mass.  Adnexa are normal, nontender, without palpable mass.     A:   Yearly exam.     P:   Pap due 2028   Mammo slip provided    Ultrasound form given   Rx Lysteda sent    RTO in one month for EB and then one year for yearly exam or sooner as needed.

## 2024-06-13 LAB
APOB+LDLR+PCSK9 GENE MUT ANL BLD/T: NOT DETECTED
BRCA1+BRCA2 DEL+DUP + FULL MUT ANL BLD/T: NOT DETECTED
MLH1+MSH2+MSH6+PMS2 GN DEL+DUP+FUL M: NOT DETECTED

## 2024-06-20 ENCOUNTER — HOSPITAL ENCOUNTER (OUTPATIENT)
Dept: RADIOLOGY | Facility: HOSPITAL | Age: 39
Discharge: HOME | End: 2024-06-20
Payer: COMMERCIAL

## 2024-06-20 DIAGNOSIS — N92.0 MENORRHAGIA WITH REGULAR CYCLE: ICD-10-CM

## 2024-06-20 PROCEDURE — 76856 US EXAM PELVIC COMPLETE: CPT

## 2024-06-20 PROCEDURE — 76830 TRANSVAGINAL US NON-OB: CPT

## 2024-06-26 ENCOUNTER — NURSE TRIAGE (OUTPATIENT)
Age: 39
End: 2024-06-26

## 2024-06-26 ENCOUNTER — TELEPHONE (OUTPATIENT)
Age: 39
End: 2024-06-26

## 2024-06-26 NOTE — TELEPHONE ENCOUNTER
Regarding: Significant findings  ----- Message from Ann MILTON sent at 6/26/2024  1:13 PM EDT -----  Tomasz from [facility] called to report significant findings on pt's recent US on 6/20.

## 2024-06-28 ENCOUNTER — TELEPHONE (OUTPATIENT)
Dept: OBGYN CLINIC | Facility: CLINIC | Age: 39
End: 2024-06-28

## 2024-06-28 NOTE — TELEPHONE ENCOUNTER
----- Message from Arlene Barrett MD sent at 6/26/2024  6:58 PM EDT -----  Please let Lorrie know that her ultrasound suggested she has a polyp in her uterus.  She did not come in for the endometrial biopsy but at this point, she should come back for a pre-op for hysteroscopy, D+C, polypectomy. Can you work with Cassandra to reschedule once you speak with her?    Thanks!!

## 2024-06-28 NOTE — TELEPHONE ENCOUNTER
Lorrie called back to let Dr. Barrett know that at this point she doesn't want to discuss any options or to have any procedures done. I advised the Patient to call office with any issues or changes.

## 2024-07-09 NOTE — TELEPHONE ENCOUNTER
Please call Jayla to let her know I would still at least recommend an endometrial biopsy to rule out precancerous cells as the cause of her bleeding.  We do not have to do the polyp removal in the OR if the biopsy is negative, but I would recommend some kind of assessment of her uterus given her history of PCOS and age.    Dr Barrett

## 2024-07-16 NOTE — TELEPHONE ENCOUNTER
Called and LM with recommendations. Just to make sure she understands we would really like to take a look with a biopsy. If she calls let Faith know and I can try and find her an appointment.

## 2024-07-30 DIAGNOSIS — K76.0 NAFLD (NONALCOHOLIC FATTY LIVER DISEASE): ICD-10-CM

## 2024-07-30 DIAGNOSIS — R74.8 ELEVATED LIVER ENZYMES: Primary | ICD-10-CM

## 2024-07-30 DIAGNOSIS — E61.1 IRON DEFICIENCY: ICD-10-CM

## 2024-07-31 ENCOUNTER — APPOINTMENT (OUTPATIENT)
Dept: LAB | Facility: HOSPITAL | Age: 39
End: 2024-07-31
Payer: COMMERCIAL

## 2024-07-31 DIAGNOSIS — R74.8 ELEVATED LIVER ENZYMES: ICD-10-CM

## 2024-07-31 DIAGNOSIS — E61.1 IRON DEFICIENCY: ICD-10-CM

## 2024-07-31 DIAGNOSIS — K76.0 NAFLD (NONALCOHOLIC FATTY LIVER DISEASE): ICD-10-CM

## 2024-07-31 LAB
ALBUMIN SERPL BCG-MCNC: 4.1 G/DL (ref 3.5–5)
ALP SERPL-CCNC: 49 U/L (ref 34–104)
ALT SERPL W P-5'-P-CCNC: 34 U/L (ref 7–52)
ANION GAP SERPL CALCULATED.3IONS-SCNC: 8 MMOL/L (ref 4–13)
AST SERPL W P-5'-P-CCNC: 20 U/L (ref 13–39)
BASOPHILS # BLD AUTO: 0.07 THOUSANDS/ÂΜL (ref 0–0.1)
BASOPHILS NFR BLD AUTO: 1 % (ref 0–1)
BILIRUB SERPL-MCNC: 0.37 MG/DL (ref 0.2–1)
BUN SERPL-MCNC: 8 MG/DL (ref 5–25)
CALCIUM SERPL-MCNC: 8.8 MG/DL (ref 8.4–10.2)
CHLORIDE SERPL-SCNC: 101 MMOL/L (ref 96–108)
CO2 SERPL-SCNC: 27 MMOL/L (ref 21–32)
CREAT SERPL-MCNC: 0.71 MG/DL (ref 0.6–1.3)
EOSINOPHIL # BLD AUTO: 0.37 THOUSAND/ÂΜL (ref 0–0.61)
EOSINOPHIL NFR BLD AUTO: 5 % (ref 0–6)
ERYTHROCYTE [DISTWIDTH] IN BLOOD BY AUTOMATED COUNT: 12.4 % (ref 11.6–15.1)
FERRITIN SERPL-MCNC: 14 NG/ML (ref 11–307)
GFR SERPL CREATININE-BSD FRML MDRD: 106 ML/MIN/1.73SQ M
GLUCOSE P FAST SERPL-MCNC: 95 MG/DL (ref 65–99)
HCT VFR BLD AUTO: 41 % (ref 34.8–46.1)
HGB BLD-MCNC: 13.7 G/DL (ref 11.5–15.4)
IMM GRANULOCYTES # BLD AUTO: 0.01 THOUSAND/UL (ref 0–0.2)
IMM GRANULOCYTES NFR BLD AUTO: 0 % (ref 0–2)
INR PPP: 1.02 (ref 0.84–1.19)
IRON SATN MFR SERPL: 19 % (ref 15–50)
IRON SERPL-MCNC: 80 UG/DL (ref 50–212)
LYMPHOCYTES # BLD AUTO: 1.76 THOUSANDS/ÂΜL (ref 0.6–4.47)
LYMPHOCYTES NFR BLD AUTO: 22 % (ref 14–44)
MCH RBC QN AUTO: 30.9 PG (ref 26.8–34.3)
MCHC RBC AUTO-ENTMCNC: 33.4 G/DL (ref 31.4–37.4)
MCV RBC AUTO: 93 FL (ref 82–98)
MONOCYTES # BLD AUTO: 0.62 THOUSAND/ÂΜL (ref 0.17–1.22)
MONOCYTES NFR BLD AUTO: 8 % (ref 4–12)
NEUTROPHILS # BLD AUTO: 5.29 THOUSANDS/ÂΜL (ref 1.85–7.62)
NEUTS SEG NFR BLD AUTO: 64 % (ref 43–75)
NRBC BLD AUTO-RTO: 0 /100 WBCS
PLATELET # BLD AUTO: 230 THOUSANDS/UL (ref 149–390)
PMV BLD AUTO: 9.6 FL (ref 8.9–12.7)
POTASSIUM SERPL-SCNC: 3.9 MMOL/L (ref 3.5–5.3)
PROT SERPL-MCNC: 6.2 G/DL (ref 6.4–8.4)
PROTHROMBIN TIME: 13.6 SECONDS (ref 11.6–14.5)
RBC # BLD AUTO: 4.43 MILLION/UL (ref 3.81–5.12)
SODIUM SERPL-SCNC: 136 MMOL/L (ref 135–147)
TIBC SERPL-MCNC: 418 UG/DL (ref 250–450)
UIBC SERPL-MCNC: 338 UG/DL (ref 155–355)
WBC # BLD AUTO: 8.12 THOUSAND/UL (ref 4.31–10.16)

## 2024-07-31 PROCEDURE — 83540 ASSAY OF IRON: CPT

## 2024-07-31 PROCEDURE — 82728 ASSAY OF FERRITIN: CPT

## 2024-07-31 PROCEDURE — 85610 PROTHROMBIN TIME: CPT

## 2024-07-31 PROCEDURE — 85025 COMPLETE CBC W/AUTO DIFF WBC: CPT

## 2024-07-31 PROCEDURE — 80053 COMPREHEN METABOLIC PANEL: CPT

## 2024-07-31 PROCEDURE — 83550 IRON BINDING TEST: CPT

## 2024-07-31 PROCEDURE — 36415 COLL VENOUS BLD VENIPUNCTURE: CPT

## 2024-08-01 ENCOUNTER — OFFICE VISIT (OUTPATIENT)
Dept: GASTROENTEROLOGY | Facility: AMBULARY SURGERY CENTER | Age: 39
End: 2024-08-01
Payer: COMMERCIAL

## 2024-08-01 ENCOUNTER — TELEPHONE (OUTPATIENT)
Dept: OTOLARYNGOLOGY | Facility: CLINIC | Age: 39
End: 2024-08-01

## 2024-08-01 VITALS
HEART RATE: 81 BPM | BODY MASS INDEX: 34.46 KG/M2 | OXYGEN SATURATION: 98 % | HEIGHT: 66 IN | SYSTOLIC BLOOD PRESSURE: 124 MMHG | DIASTOLIC BLOOD PRESSURE: 84 MMHG | WEIGHT: 214.4 LBS

## 2024-08-01 DIAGNOSIS — E61.1 IRON DEFICIENCY: ICD-10-CM

## 2024-08-01 DIAGNOSIS — K76.0 NAFLD (NONALCOHOLIC FATTY LIVER DISEASE): Primary | ICD-10-CM

## 2024-08-01 DIAGNOSIS — R74.8 ELEVATED LIVER ENZYMES: ICD-10-CM

## 2024-08-01 PROCEDURE — 99213 OFFICE O/P EST LOW 20 MIN: CPT | Performed by: FAMILY MEDICINE

## 2024-08-01 NOTE — TELEPHONE ENCOUNTER
Left VM that [facility] sent refill to [facility] also told her she will need an appointment for anymore refills per Oxana.

## 2024-08-01 NOTE — PROGRESS NOTES
[facility] - Outpatient Follow-up Note  Lorrie Arreola 39 y.o. female MRN: [id number]  Encounter: [id number]          ASSESSMENT AND PLAN:      1. NAFLD (nonalcoholic fatty liver disease)  2. Elevated liver enzymes  Patient with new mild-moderately elevated liver enzymes with ALT predominance noted in August 2023 prompting an abdominal ultrasound notable for hepatomegaly, hepatic steatosis and questionable subtle hepatic contour nodularity c/f advanced hepatic fibrosis. Since her last appointment, she has had a complete serologic evaluation which was unremarkable for competing causes of liver disease.  She also completed a US elastography notable for F0-F1 fibrosis and S3 steatosis.    Suspect that her elevated liver tests are secondary to NAFLD in the setting of obesity and a negative serologic evaluation. She has gained 6 lbs since her last appointment although she was limited in regards to exercise due to a knee injury. However, her liver enzymes are slightly improved on her most recent serologies. Her labs continue to show normal liver synthetic function and preserved platelet count.    She is aware of the basic pathophysiology of fatty liver disease and potential progress to cirrhosis if left untreated. She is also aware of recommendations in regards to treatment of fatty liver disease including steady and sustainable weight loss, optimization of her metabolic risk factors and limiting her alcohol use.  Again, offered patient referral to weight management but she declined and would like to continue with dietary/lifestyle modifications to help promote weight loss. Otherwise, she will continue to have her liver tests checked  every 6 months and will plan for a repeat US and US elastography in 1 year - rather than 2-3 years as typically recommended with F0-F1 fibrosis due to ?hepatic nodularity on ultrasound .     - US abdomen complete; Future  - US elastography/UGAP;  Future  - Hepatic function panel; Future    3. Iron deficiency  Patient with a longstanding history of iron deficiency which was attributed to heavy menses. No overt GI bleeding or additional alarm features. No family history of colon cancer. No obvious indication for bidirectional endoscopic evaluation given that she is low-average risk for the development of colon cancer and menorrhagia as the likely cause of her anemia.    She is currently taking 1 gummy vitamin containing 25 mg of elemental iron and was unable to tolerate more due to GI side effects. Offered her referral to hematology for consideration of iron infusions, patient declined. She is aware of signs/sxs's of symptomatic anemia and to promptly inform provider if experiencing such.     Follow-up in 1 year or sooner if necessary.     ______________________________________________________________________    SUBJECTIVE: Patient is a 39 y.o. female with PMH significant for obesity, eczema, right patellofemoral syndrome, vitamin D deficiency and vertigo who presents today for follow-up regarding elevated liver enzymes and fatty liver disease.     Interval history  - US elastography with F0-F1 fibrosis   - Serologic evaluation negative for competing causes of liver disease.   - Iron studies with BAUDILIO although not anemia.   - She has gained 6 lbs since her last appointment but could not exercise due to a knee injury.    Extended liver history  Lorrie was evaluated by ENT for vertigo and incidentally found to have elevated liver enzymes with an AST 88 and . Prior to this her hepatic function has been within normal limits. This prompted an abdominal ultrasound notable for hepatomegaly (21 cm) and hepatic steatosis in addition to questionable subtle hepatic nodularity concerning for advanced fibrosis. She was ordered for an acute hepatitis panel but this was not drawn. EBV panel reflecting a past infection. Serologies without thrombocytopenia or  hypoalbuminemia.     Denies a history of liver disease. Denies a family history of liver disease or liver cancer. Denies known autoimmune conditions. Denies a known past or current infection with viral hepatitis. Denies taking any additional medications, herbal supplements or performance-enhancing drugs not reflected on her med list. States she does take an excess of naproxen during her menses which was not around the time she had her labs drawn.      Denies excessive Tylenol use. Denies alcohol use. Denies marijuana or kratom use. Denies any recent illnesses or antibiotic use. Denies any liver specific complaints today.      REVIEW OF SYSTEMS IS OTHERWISE NEGATIVE.      Historical Information   Past Medical History:   Diagnosis Date   • Allergic rhinitis a few years ago    sometimes sniffly or sneezy in the spring   • Anemia     heavy periods   • Dizziness 2022    periods of persistent dizziness for several weeks at a time (Aug. 2022, Oct. 2022, Aug. 2023)   • Ear problems 2006    eczema in ears   • Liver disease 2023    NAFLD   • Miscarriage    • Nasal congestion     persistent during pregnancy and occasionally reoccurring since then   • Polycystic ovary syndrome    • Seizures (HCC) late     occurred a few times as an infant but not since     Past Surgical History:   Procedure Laterality Date   •  SECTION     • EYE SURGERY      lasik   • UTERINE FIBROID SURGERY     • WISDOM TOOTH EXTRACTION       Social History   Social History     Substance and Sexual Activity   Alcohol Use Yes    Comment: a little wine every 1 - 2 months, on average     Social History     Substance and Sexual Activity   Drug Use Never     Social History     Tobacco Use   Smoking Status Never   Smokeless Tobacco Never     Family History   Problem Relation Age of Onset   • Miscarriages / Stillbirths Mother         2 miscarriages   • Miscarriages / Stillbirths Sister         1 miscarriage  "      Meds/Allergies       Current Outpatient Medications:   •  Cholecalciferol (Vitamin D3) 125 MCG (5000 UT) CHEW  •  FERROUS BISGLYCINATE CHELATE PO  •  meclizine (ANTIVERT) 25 mg tablet  •  metoclopramide (REGLAN) 5 mg tablet  •  mometasone (ELOCON) 0.1 % lotion  •  Multiple Vitamins-Minerals (MULTIVITAMIN ADULT PO)  •  naproxen sodium (ANAPROX) 550 mg tablet  •  Triamcinolone Acetonide (NASACORT ALLERGY 24HR NA)    Allergies   Allergen Reactions   • Cat Hair Extract Itching, Nasal Congestion and Sneezing   • Pineapple - Food Allergy Abdominal Pain and GI Intolerance           Objective     Blood pressure 124/84, pulse 81, height 5' 6\" (1.676 m), weight 97.3 kg (214 lb 6.4 oz), SpO2 98%, not currently breastfeeding. Body mass index is 34.61 kg/m².      PHYSICAL EXAM:      General Appearance:   Alert, cooperative, no distress   HEENT:   Normocephalic, atraumatic, anicteric.     Neck:  Supple, symmetrical, trachea midline   Lungs:   Clear to auscultation bilaterally; no rales, rhonchi or wheezing; respirations unlabored    Heart::   Regular rate and rhythm; no murmur, rub, or gallop.   Abdomen:   Soft, non-tender, non-distended; normal bowel sounds; no masses, no organomegaly    Genitalia:   Deferred    Rectal:   Deferred    Extremities:  No cyanosis, clubbing or edema    Pulses:  2+ and symmetric    Skin:  No jaundice, rashes, or lesions    Lymph nodes:  No palpable cervical lymphadenopathy        Lab Results:   No visits with results within 1 Day(s) from this visit.   Latest known visit with results is:   Appointment on 07/31/2024   Component Date Value   • WBC 07/31/2024 8.12    • RBC 07/31/2024 4.43    • Hemoglobin 07/31/2024 13.7    • Hematocrit 07/31/2024 41.0    • MCV 07/31/2024 93    • MCH 07/31/2024 30.9    • MCHC 07/31/2024 33.4    • RDW 07/31/2024 12.4    • MPV 07/31/2024 9.6    • Platelets 07/31/2024 230    • nRBC 07/31/2024 0    • Segmented % 07/31/2024 64    • Immature Grans % 07/31/2024 0    • " Lymphocytes % 07/31/2024 22    • Monocytes % 07/31/2024 8    • Eosinophils Relative 07/31/2024 5    • Basophils Relative 07/31/2024 1    • Absolute Neutrophils 07/31/2024 5.29    • Absolute Immature Grans 07/31/2024 0.01    • Absolute Lymphocytes 07/31/2024 1.76    • Absolute Monocytes 07/31/2024 0.62    • Eosinophils Absolute 07/31/2024 0.37    • Basophils Absolute 07/31/2024 0.07    • Sodium 07/31/2024 136    • Potassium 07/31/2024 3.9    • Chloride 07/31/2024 101    • CO2 07/31/2024 27    • ANION GAP 07/31/2024 8    • BUN 07/31/2024 8    • Creatinine 07/31/2024 0.71    • Glucose, Fasting 07/31/2024 95    • Calcium 07/31/2024 8.8    • AST 07/31/2024 20    • ALT 07/31/2024 34    • Alkaline Phosphatase 07/31/2024 49    • Total Protein 07/31/2024 6.2 (L)    • Albumin 07/31/2024 4.1    • Total Bilirubin 07/31/2024 0.37    • eGFR 07/31/2024 106    • Protime 07/31/2024 13.6    • INR 07/31/2024 1.02    • Iron Saturation 07/31/2024 19    • TIBC 07/31/2024 418    • Iron 07/31/2024 80    • UIBC 07/31/2024 338    • Ferritin 07/31/2024 14          Radiology Results:   No results found.    Alice Rosa PA-C     **Please note: Dictation voice to text software may have been used in the creation of this record. Occasional wrong word or “sound alike” substitutions may have occurred due to the inherent limitations of voice recognition software. Read the chart carefully and recognize, using context, where substitutions have occurred.**

## 2025-01-16 ENCOUNTER — HOSPITAL ENCOUNTER (EMERGENCY)
Facility: HOSPITAL | Age: 40
Discharge: HOME | End: 2025-01-17
Payer: COMMERCIAL

## 2025-01-16 VITALS
RESPIRATION RATE: 18 BRPM | HEART RATE: 83 BPM | SYSTOLIC BLOOD PRESSURE: 156 MMHG | OXYGEN SATURATION: 96 % | DIASTOLIC BLOOD PRESSURE: 80 MMHG | TEMPERATURE: 98.3 F

## 2025-01-16 DIAGNOSIS — R00.2 PALPITATIONS: Primary | ICD-10-CM

## 2025-01-16 LAB
ANION GAP SERPL CALCULATED.3IONS-SCNC: 3 MMOL/L (ref 4–13)
BASOPHILS # BLD AUTO: 0.05 THOUSANDS/ΜL (ref 0–0.1)
BASOPHILS NFR BLD AUTO: 1 % (ref 0–1)
BUN SERPL-MCNC: 10 MG/DL (ref 5–25)
CALCIUM SERPL-MCNC: 9.3 MG/DL (ref 8.4–10.2)
CHLORIDE SERPL-SCNC: 105 MMOL/L (ref 96–108)
CO2 SERPL-SCNC: 30 MMOL/L (ref 21–32)
CREAT SERPL-MCNC: 0.8 MG/DL (ref 0.6–1.3)
EOSINOPHIL # BLD AUTO: 0.35 THOUSAND/ΜL (ref 0–0.61)
EOSINOPHIL NFR BLD AUTO: 4 % (ref 0–6)
ERYTHROCYTE [DISTWIDTH] IN BLOOD BY AUTOMATED COUNT: 12.6 % (ref 11.6–15.1)
GAS + CO PNL BLDA: 1.2 % (ref 0–1.5)
GFR SERPL CREATININE-BSD FRML MDRD: 91 ML/MIN/1.73SQ M
GLUCOSE SERPL-MCNC: 107 MG/DL (ref 65–140)
HCT VFR BLD AUTO: 40.1 % (ref 34.8–46.1)
HGB BLD-MCNC: 13.6 G/DL (ref 11.5–15.4)
IMM GRANULOCYTES # BLD AUTO: 0.03 THOUSAND/UL (ref 0–0.2)
IMM GRANULOCYTES NFR BLD AUTO: 0 % (ref 0–2)
LYMPHOCYTES # BLD AUTO: 1.56 THOUSANDS/ΜL (ref 0.6–4.47)
LYMPHOCYTES NFR BLD AUTO: 18 % (ref 14–44)
MCH RBC QN AUTO: 31.1 PG (ref 26.8–34.3)
MCHC RBC AUTO-ENTMCNC: 33.9 G/DL (ref 31.4–37.4)
MCV RBC AUTO: 92 FL (ref 82–98)
MONOCYTES # BLD AUTO: 0.57 THOUSAND/ΜL (ref 0.17–1.22)
MONOCYTES NFR BLD AUTO: 6 % (ref 4–12)
NEUTROPHILS # BLD AUTO: 6.35 THOUSANDS/ΜL (ref 1.85–7.62)
NEUTS SEG NFR BLD AUTO: 71 % (ref 43–75)
NRBC BLD AUTO-RTO: 0 /100 WBCS
PLATELET # BLD AUTO: 222 THOUSANDS/UL (ref 149–390)
PMV BLD AUTO: 9.5 FL (ref 8.9–12.7)
POTASSIUM SERPL-SCNC: 3.6 MMOL/L (ref 3.5–5.3)
RBC # BLD AUTO: 4.38 MILLION/UL (ref 3.81–5.12)
SODIUM SERPL-SCNC: 138 MMOL/L (ref 135–147)
WBC # BLD AUTO: 8.91 THOUSAND/UL (ref 4.31–10.16)

## 2025-01-16 PROCEDURE — 80048 BASIC METABOLIC PNL TOTAL CA: CPT

## 2025-01-16 PROCEDURE — 85025 COMPLETE CBC W/AUTO DIFF WBC: CPT

## 2025-01-16 PROCEDURE — 93005 ELECTROCARDIOGRAM TRACING: CPT

## 2025-01-16 PROCEDURE — 36415 COLL VENOUS BLD VENIPUNCTURE: CPT

## 2025-01-16 PROCEDURE — 99285 EMERGENCY DEPT VISIT HI MDM: CPT

## 2025-01-16 PROCEDURE — 82375 ASSAY CARBOXYHB QUANT: CPT

## 2025-01-16 PROCEDURE — 99284 EMERGENCY DEPT VISIT MOD MDM: CPT

## 2025-01-17 LAB
ATRIAL RATE: 78 BPM
P AXIS: 59 DEGREES
PR INTERVAL: 158 MS
QRS AXIS: 28 DEGREES
QRSD INTERVAL: 68 MS
QT INTERVAL: 346 MS
QTC INTERVAL: 394 MS
T WAVE AXIS: 8 DEGREES
VENTRICULAR RATE: 78 BPM

## 2025-01-17 PROCEDURE — 93010 ELECTROCARDIOGRAM REPORT: CPT | Performed by: INTERNAL MEDICINE

## 2025-01-17 NOTE — ED PROVIDER NOTES
Time reflects when diagnosis was documented in both MDM as applicable and the Disposition within this note       Time User Action Codes Description Comment    1/16/2025  9:54 PM Keith Dowell Add [R00.2] Palpitations           ED Disposition       ED Disposition   Discharge    Condition   Stable    Date/Time   Thu Jan 16, 2025  9:54 PM    Comment   Lorrie Arreola discharge to home/self care.                   Assessment & Plan       Medical Decision Making  Amount and/or Complexity of Data Reviewed  Labs: ordered. Decision-making details documented in ED Course.  ECG/medicine tests:  Decision-making details documented in ED Course.      40 y/o F presents with palpitations, ha, possible CO exposure. VSS. Placed pt on NRB while pending carboxyhemoglobin result. Ultimately carboxyhgb normal. Pt does have CO detectors in house. EKG without acute findings. Pt stable for discharge with RTED precautions.     ED Course as of 01/17/25 0056   Thu Jan 16, 2025 2125 ECG 12 lead  Procedure Note: EKG  Date/Time: 01/16/25 9:25 PM   Interpreted by: Keith Dowell MD  Indications / Diagnosis: palpitations  ECG reviewed by me, the ED Provider: yes   The EKG demonstrates:  Rhythm: normal sinus  Intervals: normal intervals  Axis: normal axis  QRS/Blocks: normal QRS  ST Changes: No acute ST Changes, no STD/AMBREEN.     2154 Carbon Monoxide, Blood: 1.2       Medications - No data to display    ED Risk Strat Scores                                              History of Present Illness       Chief Complaint   Patient presents with    Dizziness     Pt c/o dizziness with palpitations nausea and tunnel vision. Believes it might be carbon monoxide poison due to gas furnace is broken and would feel better stepping out side. Was unsure till she had someone working on it tonight and told it was leaking.        Past Medical History:   Diagnosis Date    Allergic rhinitis a few years ago    sometimes sniffly or sneezy in the spring    Anemia 2000     heavy periods    Dizziness 2022    periods of persistent dizziness for several weeks at a time (Aug. 2022, Oct. 2022, Aug. 2023)    Ear problems 2006    eczema in ears    Liver disease 2023    NAFLD    Miscarriage 2012    Nasal congestion 2014    persistent during pregnancy and occasionally reoccurring since then    Polycystic ovary syndrome 2012    Seizures (HCC) late     occurred a few times as an infant but not since      Past Surgical History:   Procedure Laterality Date     SECTION      EYE SURGERY      lasik    UTERINE FIBROID SURGERY      WISDOM TOOTH EXTRACTION        Family History   Problem Relation Age of Onset    Miscarriages / Stillbirths Mother         2 miscarriages    Miscarriages / Stillbirths Sister         1 miscarriage      Social History     Tobacco Use    Smoking status: Never    Smokeless tobacco: Never   Vaping Use    Vaping status: Never Used   Substance Use Topics    Alcohol use: Not Currently     Comment: a little wine every 1 - 2 months, on average    Drug use: Never      E-Cigarette/Vaping    E-Cigarette Use Never User       E-Cigarette/Vaping Substances    Nicotine No     THC No     CBD No     Flavoring No     Other No     Unknown No       I have reviewed and agree with the history as documented.     HPI    38 y/o F presents for dizziness and palpitations. Pt concerned she has carbon monoxide poisoning. Has intermittent headaches, nausea. Lives with  and child who have also had intermittent headaches. Informed her furnace was leaking and thinks this is source. Associated palpitations. Denies SOB, cp.     Review of Systems   Constitutional:  Negative for chills and fever.   HENT:  Negative for ear pain and sore throat.    Eyes:  Negative for pain and visual disturbance.   Respiratory:  Negative for cough and shortness of breath.    Cardiovascular:  Positive for palpitations. Negative for chest pain.   Gastrointestinal:  Positive for nausea. Negative for  abdominal pain and vomiting.   Genitourinary:  Negative for dysuria and hematuria.   Musculoskeletal:  Negative for arthralgias and back pain.   Skin:  Negative for color change and rash.   Neurological:  Positive for headaches. Negative for seizures and syncope.   All other systems reviewed and are negative.          Objective       ED Triage Vitals [01/16/25 2107]   Temperature Pulse Blood Pressure Respirations SpO2 Patient Position - Orthostatic VS   98.3 °F (36.8 °C) 83 156/80 18 96 % Sitting      Temp Source Heart Rate Source BP Location FiO2 (%) Pain Score    Oral Monitor Left arm -- --      Vitals      Date and Time Temp Pulse SpO2 Resp BP Pain Score FACES Pain Rating User   01/16/25 2107 98.3 °F (36.8 °C) 83 96 % 18 156/80 -- -- [facility]            Physical Exam  Vitals and nursing note reviewed.   Constitutional:       General: She is not in acute distress.  HENT:      Head: Normocephalic and atraumatic.      Right Ear: External ear normal.      Left Ear: External ear normal.      Nose: Nose normal.      Mouth/Throat:      Pharynx: Oropharynx is clear.   Eyes:      Extraocular Movements: Extraocular movements intact.      Pupils: Pupils are equal, round, and reactive to light.   Cardiovascular:      Rate and Rhythm: Normal rate and regular rhythm.      Pulses: Normal pulses.      Heart sounds: Normal heart sounds. No murmur heard.     No friction rub. No gallop.   Pulmonary:      Effort: Pulmonary effort is normal. No respiratory distress.      Breath sounds: Normal breath sounds. No wheezing, rhonchi or rales.   Abdominal:      General: Abdomen is flat. There is no distension.      Palpations: Abdomen is soft.      Tenderness: There is no abdominal tenderness. There is no guarding or rebound.   Musculoskeletal:         General: No deformity. Normal range of motion.      Cervical back: Normal range of motion.      Right lower leg: No edema.      Left lower leg: No edema.   Skin:     General: Skin is warm and  dry.      Capillary Refill: Capillary refill takes less than 2 seconds.      Findings: No rash.   Neurological:      General: No focal deficit present.      Mental Status: She is alert and oriented to person, place, and time.      Gait: Gait normal.   Psychiatric:         Mood and Affect: Mood normal.         Results Reviewed       Procedure Component Value Units Date/Time    Basic metabolic panel [[id number]]  (Abnormal) Collected: 01/16/25 2125    Lab Status: Final result Specimen: Blood from Arm, Right Updated: 01/16/25 2150     Sodium 138 mmol/L      Potassium 3.6 mmol/L      Chloride 105 mmol/L      CO2 30 mmol/L      ANION GAP 3 mmol/L      BUN 10 mg/dL      Creatinine 0.80 mg/dL      Glucose 107 mg/dL      Calcium 9.3 mg/dL      eGFR 91 ml/min/1.73sq m     Narrative:      National Kidney Disease Foundation guidelines for Chronic Kidney Disease (CKD):     Stage 1 with normal or high GFR (GFR > 90 mL/min/1.73 square meters)    Stage 2 Mild CKD (GFR = 60-89 mL/min/1.73 square meters)    Stage 3A Moderate CKD (GFR = 45-59 mL/min/1.73 square meters)    Stage 3B Moderate CKD (GFR = 30-44 mL/min/1.73 square meters)    Stage 4 Severe CKD (GFR = 15-29 mL/min/1.73 square meters)    Stage 5 End Stage CKD (GFR <15 mL/min/1.73 square meters)  Note: GFR calculation is accurate only with a steady state creatinine    Carboxyhemoglobin [[id number]]  (Normal) Collected: 01/16/25 2125    Lab Status: Final result Specimen: Blood from Arm, Right Updated: 01/16/25 2142     Carbon Monoxide, Blood 1.2 %     Narrative:      Therapeutic levels (1 mg/mL and 2 mg/mL) of hydroxocobalamin may interfere with the fCOHb and fMetHb where it may cause lower than expected values  Normal Carboxyhemoglobin range for nonsmokers is <1.5%   Normal Carboxyhemoglobin range for smokers is 1.5% to 5.1%     CBC and differential [[id number]] Collected: 01/16/25 2125    Lab Status: Final result Specimen: Blood from Arm, Right Updated: 01/16/25 2139     WBC  8.91 Thousand/uL      RBC 4.38 Million/uL      Hemoglobin 13.6 g/dL      Hematocrit 40.1 %      MCV 92 fL      MCH 31.1 pg      MCHC 33.9 g/dL      RDW 12.6 %      MPV 9.5 fL      Platelets 222 Thousands/uL      nRBC 0 /100 WBCs      Segmented % 71 %      Immature Grans % 0 %      Lymphocytes % 18 %      Monocytes % 6 %      Eosinophils Relative 4 %      Basophils Relative 1 %      Absolute Neutrophils 6.35 Thousands/µL      Absolute Immature Grans 0.03 Thousand/uL      Absolute Lymphocytes 1.56 Thousands/µL      Absolute Monocytes 0.57 Thousand/µL      Eosinophils Absolute 0.35 Thousand/µL      Basophils Absolute 0.05 Thousands/µL             No orders to display       Procedures    ED Medication and Procedure Management   Prior to Admission Medications   Prescriptions Last Dose Informant Patient Reported? Taking?   Cholecalciferol (Vitamin D3) 125 MCG (5000 UT) CHEW   Yes No   FERROUS BISGLYCINATE CHELATE PO   Yes No   Multiple Vitamins-Minerals (MULTIVITAMIN ADULT PO)  Self Yes No   Sig: Take 1 tablet by mouth daily   Triamcinolone Acetonide (NASACORT ALLERGY 24HR NA)  Self Yes No   Sig: PRN    meclizine (ANTIVERT) 25 mg tablet   No No   Sig: Take 1 tablet (25 mg total) by mouth every 12 (twelve) hours as needed for dizziness PRN   metoclopramide (REGLAN) 5 mg tablet  Self Yes No   Sig: Take 5 mg by mouth as needed   mometasone (ELOCON) 0.1 % lotion  Self Yes No   Sig: Apply topically   naproxen sodium (ANAPROX) 550 mg tablet  Self Yes No   Sig: PRN      Facility-Administered Medications: None     Discharge Medication List as of 1/16/2025  9:54 PM        CONTINUE these medications which have NOT CHANGED    Details   Cholecalciferol (Vitamin D3) 125 MCG (5000 UT) CHEW Historical Med      FERROUS BISGLYCINATE CHELATE PO Historical Med      meclizine (ANTIVERT) 25 mg tablet Take 1 tablet (25 mg total) by mouth every 12 (twelve) hours as needed for dizziness PRN, Starting Thu 8/1/2024, Normal      metoclopramide  (REGLAN) 5 mg tablet Take 5 mg by mouth as needed, Starting Mon 8/15/2022, Historical Med      mometasone (ELOCON) 0.1 % lotion Apply topically, Historical Med      Multiple Vitamins-Minerals (MULTIVITAMIN ADULT PO) Take 1 tablet by mouth daily, Starting Thu 12/14/2017, Historical Med      naproxen sodium (ANAPROX) 550 mg tablet PRN, Starting Sat 5/28/2022, Historical Med      Triamcinolone Acetonide (NASACORT ALLERGY 24HR NA) PRN , Starting Mon 3/1/2021, Historical Med           No discharge procedures on file.  ED SEPSIS DOCUMENTATION   Time reflects when diagnosis was documented in both MDM as applicable and the Disposition within this note       Time User Action Codes Description Comment    1/16/2025  9:54 PM Keith Dowlel Add [R00.2] Palpitations                  Keith Dowell MD  01/17/25 0056

## 2025-05-12 ENCOUNTER — OFFICE VISIT (OUTPATIENT)
Dept: FAMILY MEDICINE CLINIC | Facility: CLINIC | Age: 40
End: 2025-05-12
Payer: COMMERCIAL

## 2025-05-12 VITALS
RESPIRATION RATE: 16 BRPM | WEIGHT: 217 LBS | DIASTOLIC BLOOD PRESSURE: 78 MMHG | SYSTOLIC BLOOD PRESSURE: 114 MMHG | HEIGHT: 66 IN | TEMPERATURE: 98 F | HEART RATE: 72 BPM | BODY MASS INDEX: 34.87 KG/M2

## 2025-05-12 DIAGNOSIS — Z00.00 ANNUAL PHYSICAL EXAM: Primary | ICD-10-CM

## 2025-05-12 DIAGNOSIS — R42 DIZZINESS: ICD-10-CM

## 2025-05-12 DIAGNOSIS — N92.1 MENORRHAGIA WITH IRREGULAR CYCLE: ICD-10-CM

## 2025-05-12 DIAGNOSIS — Z13.29 SCREENING FOR THYROID DISORDER: ICD-10-CM

## 2025-05-12 DIAGNOSIS — Z13.6 SCREENING FOR CARDIOVASCULAR CONDITION: ICD-10-CM

## 2025-05-12 PROCEDURE — 99396 PREV VISIT EST AGE 40-64: CPT | Performed by: NURSE PRACTITIONER

## 2025-05-12 RX ORDER — TRANEXAMIC ACID 650 MG/1
650 TABLET ORAL 2 TIMES DAILY
Qty: 21 TABLET | Refills: 1 | Status: SHIPPED | OUTPATIENT
Start: 2025-05-12

## 2025-05-12 RX ORDER — FEXOFENADINE HCL 180 MG/1
TABLET ORAL
COMMUNITY
Start: 2024-08-01

## 2025-05-12 RX ORDER — MELATONIN 10 MG/ML
DROPS ORAL
COMMUNITY
Start: 2023-08-01

## 2025-05-12 RX ORDER — TRANEXAMIC ACID 650 MG/1
TABLET ORAL
COMMUNITY
Start: 2021-07-01 | End: 2025-05-12

## 2025-05-12 NOTE — PATIENT INSTRUCTIONS
"Magnesium glycinate and/or Ashwaghanda       Patient Education     Routine physical for adults   The Basics   Written by the doctors and editors at [facility]   What is a physical? -- A physical is a routine visit, or \"check-up,\" with your doctor. You might also hear it called a \"wellness visit\" or \"preventive visit.\"  During each visit, the doctor will:   Ask about your physical and mental health   Ask about your habits, behaviors, and lifestyle   Do an exam   Give you vaccines if needed   Talk to you about any medicines you take   Give advice about your health   Answer your questions  Getting regular check-ups is an important part of taking care of your health. It can help your doctor find and treat any problems you have. But it's also important for preventing health problems.  A routine physical is different from a \"sick visit.\" A sick visit is when you see a doctor because of a health concern or problem. Since physicals are scheduled ahead of time, you can think about what you want to ask the doctor.  How often should I get a physical? -- It depends on your age and health. In general, for people age 21 years and older:   If you are younger than 50 years, you might be able to get a physical every 3 years.   If you are 50 years or older, your doctor might recommend a physical every year.  If you have an ongoing health condition, like diabetes or high blood pressure, your doctor will probably want to see you more often.  What happens during a physical? -- In general, each visit will include:   Physical exam - The doctor or nurse will check your height, weight, heart rate, and blood pressure. They will also look at your eyes and ears. They will ask about how you are feeling and whether you have any symptoms that bother you.   Medicines - It's a good idea to bring a list of all the medicines you take to each doctor visit. Your doctor will talk to you about your medicines and answer any questions. Tell them if you are " "having any side effects that bother you. You should also tell them if you are having trouble paying for any of your medicines.   Habits and behaviors - This includes:   Your diet   Your exercise habits   Whether you smoke, drink alcohol, or use drugs   Whether you are sexually active   Whether you feel safe at home  Your doctor will talk to you about things you can do to improve your health and lower your risk of health problems. They will also offer help and support. For example, if you want to quit smoking, they can give you advice and might prescribe medicines. If you want to improve your diet or get more physical activity, they can help you with this, too.   Lab tests, if needed - The tests you get will depend on your age and situation. For example, your doctor might want to check your:   Cholesterol   Blood sugar   Iron level   Vaccines - The recommended vaccines will depend on your age, health, and what vaccines you already had. Vaccines are very important because they can prevent certain serious or deadly infections.   Discussion of screening - \"Screening\" means checking for diseases or other health problems before they cause symptoms. Your doctor can recommend screening based on your age, risk, and preferences. This might include tests to check for:   Cancer, such as breast, prostate, cervical, ovarian, colorectal, prostate, lung, or skin cancer   Sexually transmitted infections, such as chlamydia and gonorrhea   Mental health conditions like depression and anxiety  Your doctor will talk to you about the different types of screening tests. They can help you decide which screenings to have. They can also explain what the results might mean.   Answering questions - The physical is a good time to ask the doctor or nurse questions about your health. If needed, they can refer you to other doctors or specialists, too.  Adults older than 65 years often need other care, too. As you get older, your doctor will talk " to you about:   How to prevent falling at home   Hearing or vision tests   Memory testing   How to take your medicines safely   Making sure that you have the help and support you need at home  All topics are updated as new evidence becomes available and our peer review process is complete.  This topic retrieved from "[employer] on: May 02, 2024.  Topic 223044 Version 1.0  Release: 32.4.3 - C32.122  © 2024 [employer] and/or its affiliates. All rights reserved.  Consumer Information Use and Disclaimer   Disclaimer: This generalized information is a limited summary of diagnosis, treatment, and/or medication information. It is not meant to be comprehensive and should be used as a tool to help the user understand and/or assess potential diagnostic and treatment options. It does NOT include all information about conditions, treatments, medications, side effects, or risks that may apply to a specific patient. It is not intended to be medical advice or a substitute for the medical advice, diagnosis, or treatment of a health care provider based on the health care provider's examination and assessment of a patient's specific and unique circumstances. Patients must speak with a health care provider for complete information about their health, medical questions, and treatment options, including any risks or benefits regarding use of medications. This information does not endorse any treatments or medications as safe, effective, or approved for treating a specific patient. [employer] Inc[employer] and its affiliates disclaim any warranty or liability relating to this information or the use thereof.The use of this information is governed by the Terms of Use, available at https://www.wolterskluwer.com/en/know/clinical-effectiveness-terms. 2024© [employer] and its affiliates and/or licensors. All rights reserved.  Copyright   © 2024 [employer] and/or its affiliates. All rights reserved.

## 2025-05-12 NOTE — PROGRESS NOTES
Adult Annual Physical  Name: Lorrie Arreola      : 1985      MRN: [id number]  Encounter Provider: VICTOR HUGO Mckeon  Encounter Date: 2025   Encounter department: [facility]    :  Assessment & Plan  Annual physical exam         Screening for cardiovascular condition    Orders:  •  CBC and differential; Future  •  Comprehensive metabolic panel; Future  •  Lipid Panel with Direct LDL reflex; Future    Screening for thyroid disorder    Orders:  •  TSH, 3rd generation with Free T4 reflex; Future    Dizziness    Orders:  •  Magnesium; Future  •  Fe+TIBC+Jeremy; Future  •  Vitamin B12; Future    Menorrhagia with irregular cycle    Orders:  •  Tranexamic Acid 650 MG TABS; Take 1 tablet (650 mg total) by mouth 2 (two) times a day PRN        Preventive Screenings:  - Diabetes Screening: screening up-to-date and orders placed  - Cholesterol Screening: orders placed   - Hepatitis C screening: screening up-to-date   - Cervical cancer screening: screening up-to-date   - Breast cancer screening: screening up-to-date   - Colon cancer screening: screening not indicated   - Lung cancer screening: screening not indicated     Immunizations:  - Immunizations due: UTD    Counseling/Anticipatory Guidance:    - Diet: discussed recommendations for a healthy/well-balanced diet.   - Exercise: the importance of regular exercise/physical activity was discussed. Recommend exercise 3-5 times per week for at least 30 minutes.       Depression Screening and Follow-up Plan: Patient was screened for depression during today's encounter. They screened negative with a PHQ-2 score of 1.          History of Present Illness     Adult Annual Physical:  Patient presents for annual physical. Here today for CPE.  She has been having continued dizzy spells occasionally associate with heart pounding.  Was seen in the ER, EKG was normal.  Notices this most in the evening or when she was sleeping.  She does check her heart rate on  "occasion and has a device to do a 6 the tracing, which is apparently normal without abnormal premature beats  Sees GYN.  Was feeling more irritable related to hormonal changes.     Diet and Physical Activity:  - Diet/Nutrition: well balanced diet, portion control, low calorie diet, low fat diet and consuming 3-5 servings of fruits/vegetables daily. liver-friendly diet  - Exercise: walking, moderate cardiovascular exercise, 3-4 times a week on average and 30-60 minutes on average.    Depression Screening:  - PHQ-2 Score: 1    General Health:  - Sleep: 4-6 hours of sleep on average.  - Hearing: normal hearing bilateral ears.  - Vision: no vision problems, most recent eye exam < 1 year ago and previous LASIK surgery.  - Dental: no dental visits for > 1 year, brushes teeth three times daily and floss regularly.    /GYN Health:  - Follows with GYN: yes.   - Menopause: premenopausal.   - Last menstrual cycle: 4/11/2025.   - History of STDs: no  - Contraception:. NFP      Advanced Care Planning:  - Has an advanced directive?: no    - Has a durable medical POA?: yes      Review of Systems   Constitutional: Negative.    Respiratory: Negative.     Cardiovascular:  Positive for palpitations.   Gastrointestinal: Negative.    Genitourinary: Negative.    Neurological:  Positive for dizziness.   Psychiatric/Behavioral:          See HPI         Objective   /78   Pulse 72   Temp 98 °F (36.7 °C)   Resp 16   Ht 5' 6\" (1.676 m)   Wt 98.4 kg (217 lb)   LMP 05/12/2025 (Exact Date)   BMI 35.02 kg/m²     Physical Exam  Vitals and nursing note reviewed.   Constitutional:       General: She is not in acute distress.     Appearance: Normal appearance. She is well-developed.   HENT:      Head: Normocephalic and atraumatic.      Right Ear: Tympanic membrane and external ear normal.      Left Ear: Tympanic membrane and external ear normal.      Nose: Nose normal.      Mouth/Throat:      Pharynx: Oropharynx is clear.   Eyes:      " Extraocular Movements: Extraocular movements intact.      Conjunctiva/sclera: Conjunctivae normal.      Pupils: Pupils are equal, round, and reactive to light.   Neck:      Thyroid: No thyromegaly.      Vascular: No carotid bruit.   Cardiovascular:      Rate and Rhythm: Normal rate and regular rhythm.      Pulses: Normal pulses.      Heart sounds: Normal heart sounds. No murmur heard.  Pulmonary:      Effort: Pulmonary effort is normal.      Breath sounds: Normal breath sounds.   Abdominal:      General: Bowel sounds are normal. There is no distension.      Palpations: Abdomen is soft.      Tenderness: There is no abdominal tenderness.   Musculoskeletal:         General: No deformity. Normal range of motion.      Cervical back: Normal range of motion and neck supple.   Lymphadenopathy:      Cervical: No cervical adenopathy.   Skin:     General: Skin is warm and dry.      Capillary Refill: Capillary refill takes less than 2 seconds.   Neurological:      Mental Status: She is alert.      Sensory: No sensory deficit.      Motor: No abnormal muscle tone.      Coordination: Coordination normal.      Deep Tendon Reflexes: Reflexes normal.   Psychiatric:         Mood and Affect: Mood normal.         Behavior: Behavior normal.

## 2025-05-14 ENCOUNTER — RESULTS FOLLOW-UP (OUTPATIENT)
Dept: FAMILY MEDICINE CLINIC | Facility: CLINIC | Age: 40
End: 2025-05-14

## 2025-05-14 LAB
ALBUMIN SERPL-MCNC: 4.3 G/DL (ref 3.9–4.9)
ALP SERPL-CCNC: 65 IU/L (ref 44–121)
ALT SERPL-CCNC: 39 IU/L (ref 0–32)
AST SERPL-CCNC: 26 IU/L (ref 0–40)
BASOPHILS # BLD AUTO: 0.1 X10E3/UL (ref 0–0.2)
BASOPHILS NFR BLD AUTO: 1 %
BILIRUB SERPL-MCNC: 0.3 MG/DL (ref 0–1.2)
BUN SERPL-MCNC: 9 MG/DL (ref 6–24)
BUN/CREAT SERPL: 12 (ref 9–23)
CALCIUM SERPL-MCNC: 8.8 MG/DL (ref 8.7–10.2)
CHLORIDE SERPL-SCNC: 103 MMOL/L (ref 96–106)
CHOLEST SERPL-MCNC: 213 MG/DL (ref 100–199)
CO2 SERPL-SCNC: 22 MMOL/L (ref 20–29)
CREAT SERPL-MCNC: 0.78 MG/DL (ref 0.57–1)
EGFR: 97 ML/MIN/1.73
EOSINOPHIL # BLD AUTO: 0.3 X10E3/UL (ref 0–0.4)
EOSINOPHIL NFR BLD AUTO: 4 %
ERYTHROCYTE [DISTWIDTH] IN BLOOD BY AUTOMATED COUNT: 12.5 % (ref 11.7–15.4)
FERRITIN SERPL-MCNC: 30 NG/ML (ref 15–150)
GLOBULIN SER-MCNC: 2.2 G/DL (ref 1.5–4.5)
GLUCOSE SERPL-MCNC: 88 MG/DL (ref 70–99)
HCT VFR BLD AUTO: 39.3 % (ref 34–46.6)
HDLC SERPL-MCNC: 43 MG/DL
HGB BLD-MCNC: 13.4 G/DL (ref 11.1–15.9)
IMM GRANULOCYTES # BLD: 0 X10E3/UL (ref 0–0.1)
IMM GRANULOCYTES NFR BLD: 0 %
IRON SATN MFR SERPL: 19 % (ref 15–55)
IRON SERPL-MCNC: 76 UG/DL (ref 27–159)
LDLC SERPL CALC-MCNC: 125 MG/DL (ref 0–99)
LDLC/HDLC SERPL: 2.9 RATIO (ref 0–3.2)
LYMPHOCYTES # BLD AUTO: 1.7 X10E3/UL (ref 0.7–3.1)
LYMPHOCYTES NFR BLD AUTO: 23 %
MAGNESIUM SERPL-MCNC: 1.8 MG/DL (ref 1.6–2.3)
MCH RBC QN AUTO: 31.1 PG (ref 26.6–33)
MCHC RBC AUTO-ENTMCNC: 34.1 G/DL (ref 31.5–35.7)
MCV RBC AUTO: 91 FL (ref 79–97)
MICRODELETION SYND BLD/T FISH: NORMAL
MONOCYTES # BLD AUTO: 0.5 X10E3/UL (ref 0.1–0.9)
MONOCYTES NFR BLD AUTO: 7 %
NEUTROPHILS # BLD AUTO: 4.9 X10E3/UL (ref 1.4–7)
NEUTROPHILS NFR BLD AUTO: 65 %
PLATELET # BLD AUTO: 216 X10E3/UL (ref 150–450)
POTASSIUM SERPL-SCNC: 4.1 MMOL/L (ref 3.5–5.2)
PROT SERPL-MCNC: 6.5 G/DL (ref 6–8.5)
RBC # BLD AUTO: 4.31 X10E6/UL (ref 3.77–5.28)
SL AMB VLDL CHOLESTEROL CALC: 45 MG/DL (ref 5–40)
SODIUM SERPL-SCNC: 139 MMOL/L (ref 134–144)
TIBC SERPL-MCNC: 400 UG/DL (ref 250–450)
TRIGL SERPL-MCNC: 252 MG/DL (ref 0–149)
TSH SERPL DL<=0.005 MIU/L-ACNC: 2.6 UIU/ML (ref 0.45–4.5)
UIBC SERPL-MCNC: 324 UG/DL (ref 131–425)
VIT B12 SERPL-MCNC: 720 PG/ML (ref 232–1245)
WBC # BLD AUTO: 7.6 X10E3/UL (ref 3.4–10.8)

## 2025-07-12 DIAGNOSIS — N92.1 MENORRHAGIA WITH IRREGULAR CYCLE: ICD-10-CM

## 2025-07-15 RX ORDER — TRANEXAMIC ACID 650 MG/1
650 TABLET ORAL 2 TIMES DAILY
Qty: 21 TABLET | Refills: 1 | Status: SHIPPED | OUTPATIENT
Start: 2025-07-15

## 2025-08-14 ENCOUNTER — TELEPHONE (OUTPATIENT)
Age: 40
End: 2025-08-14

## 2025-08-18 ENCOUNTER — OFFICE VISIT (OUTPATIENT)
Dept: OTOLARYNGOLOGY | Facility: CLINIC | Age: 40
End: 2025-08-18

## 2025-08-18 VITALS
OXYGEN SATURATION: 97 % | HEART RATE: 71 BPM | HEIGHT: 66 IN | TEMPERATURE: 98.2 F | WEIGHT: 190 LBS | BODY MASS INDEX: 30.53 KG/M2

## 2025-08-18 DIAGNOSIS — H60.8X3 CHRONIC REACTIVE OTITIS EXTERNA OF BOTH EARS: Primary | ICD-10-CM

## 2025-08-18 RX ORDER — FLUOCINOLONE ACETONIDE 0.11 MG/ML
5 OIL AURICULAR (OTIC) WEEKLY
Qty: 20 ML | Refills: 4 | Status: SHIPPED | OUTPATIENT
Start: 2025-08-18

## 2025-08-18 RX ORDER — MOMETASONE FUROATE 1 MG/G
CREAM TOPICAL
Qty: 45 G | Refills: 1 | Status: SHIPPED | OUTPATIENT
Start: 2025-08-18